# Patient Record
Sex: FEMALE | Race: WHITE | NOT HISPANIC OR LATINO
[De-identification: names, ages, dates, MRNs, and addresses within clinical notes are randomized per-mention and may not be internally consistent; named-entity substitution may affect disease eponyms.]

---

## 2017-03-21 ENCOUNTER — APPOINTMENT (OUTPATIENT)
Dept: RHEUMATOLOGY | Facility: CLINIC | Age: 77
End: 2017-03-21

## 2017-03-30 ENCOUNTER — APPOINTMENT (OUTPATIENT)
Dept: RHEUMATOLOGY | Facility: CLINIC | Age: 77
End: 2017-03-30

## 2017-03-30 VITALS
BODY MASS INDEX: 39.65 KG/M2 | WEIGHT: 210 LBS | RESPIRATION RATE: 18 BRPM | DIASTOLIC BLOOD PRESSURE: 66 MMHG | TEMPERATURE: 98.2 F | SYSTOLIC BLOOD PRESSURE: 140 MMHG | HEIGHT: 61 IN | HEART RATE: 72 BPM

## 2017-03-30 DIAGNOSIS — M54.2 CERVICALGIA: ICD-10-CM

## 2017-03-30 RX ORDER — ATORVASTATIN CALCIUM 80 MG/1
TABLET, FILM COATED ORAL
Refills: 0 | Status: ACTIVE | COMMUNITY

## 2017-03-30 RX ORDER — MULTIVITAMIN
CAPSULE ORAL
Refills: 0 | Status: ACTIVE | COMMUNITY

## 2017-03-30 RX ORDER — PSYLLIUM HUSK 0.4 G
CAPSULE ORAL
Refills: 0 | Status: ACTIVE | COMMUNITY

## 2017-03-31 LAB
BASOPHILS # BLD AUTO: 0.03 K/UL
BASOPHILS NFR BLD AUTO: 0.5 %
BILIRUB UR QL STRIP: NORMAL
COLLECTION METHOD: NORMAL
EOSINOPHIL # BLD AUTO: 0.26 K/UL
EOSINOPHIL NFR BLD AUTO: 4.8 %
FOLATE SERPL-MCNC: >20 NG/ML
GLUCOSE UR-MCNC: NORMAL
HCG UR QL: 0.2 EU/DL
HCT VFR BLD CALC: 42.1 %
HGB BLD-MCNC: 13.5 G/DL
HGB UR QL STRIP.AUTO: NORMAL
IMM GRANULOCYTES NFR BLD AUTO: 0 %
KETONES UR-MCNC: NORMAL
LEUKOCYTE ESTERASE UR QL STRIP: NORMAL
LYMPHOCYTES # BLD AUTO: 1.79 K/UL
LYMPHOCYTES NFR BLD AUTO: 32.7 %
MAN DIFF?: NORMAL
MCHC RBC-ENTMCNC: 31 PG
MCHC RBC-ENTMCNC: 32.1 GM/DL
MCV RBC AUTO: 96.8 FL
MONOCYTES # BLD AUTO: 0.38 K/UL
MONOCYTES NFR BLD AUTO: 6.9 %
NEUTROPHILS # BLD AUTO: 3.01 K/UL
NEUTROPHILS NFR BLD AUTO: 55.1 %
NITRITE UR QL STRIP: NORMAL
PH UR STRIP: 5.5
PLATELET # BLD AUTO: 213 K/UL
PROT UR STRIP-MCNC: NORMAL
RBC # BLD: 4.35 M/UL
RBC # FLD: 13.7 %
SP GR UR STRIP: 1.02
TSH SERPL-ACNC: 1.59 UIU/ML
VIT B12 SERPL-MCNC: 942 PG/ML
WBC # FLD AUTO: 5.47 K/UL
WESR: 15

## 2017-04-02 LAB
ALBUMIN SERPL ELPH-MCNC: 4 G/DL
ALP BLD-CCNC: 79 U/L
ALT SERPL-CCNC: 13 U/L
ANION GAP SERPL CALC-SCNC: 18 MMOL/L
AST SERPL-CCNC: 15 U/L
BILIRUB SERPL-MCNC: 0.3 MG/DL
BUN SERPL-MCNC: 24 MG/DL
CALCIUM SERPL-MCNC: 9.8 MG/DL
CHLORIDE SERPL-SCNC: 104 MMOL/L
CO2 SERPL-SCNC: 24 MMOL/L
CREAT SERPL-MCNC: 0.96 MG/DL
CRP SERPL-MCNC: 0.44 MG/DL
ENA SS-A AB SER IA-ACNC: <0.2 AL
ENA SS-B AB SER IA-ACNC: <0.2 AL
GLUCOSE SERPL-MCNC: 148 MG/DL
LDH SERPL-CCNC: 177 U/L
PHOSPHATE SERPL-MCNC: 3.6 MG/DL
POTASSIUM SERPL-SCNC: 3.6 MMOL/L
PROT SERPL-MCNC: 6.4 G/DL
RPR SER-TITR: NORMAL
SODIUM SERPL-SCNC: 146 MMOL/L

## 2017-04-04 LAB
DEPRECATED KAPPA LC FREE/LAMBDA SER: 1.36 RATIO
IGA SER QL IEP: 238 MG/DL
IGG SER QL IEP: 793 MG/DL
IGM SER QL IEP: 234 MG/DL
KAPPA LC CSF-MCNC: 2.05 MG/DL
KAPPA LC SERPL-MCNC: 2.78 MG/DL
M PROTEIN SPEC IFE-MCNC: NORMAL

## 2017-04-18 ENCOUNTER — APPOINTMENT (OUTPATIENT)
Dept: MAMMOGRAPHY | Facility: CLINIC | Age: 77
End: 2017-04-18

## 2017-04-18 ENCOUNTER — APPOINTMENT (OUTPATIENT)
Dept: ULTRASOUND IMAGING | Facility: CLINIC | Age: 77
End: 2017-04-18

## 2017-04-18 ENCOUNTER — OUTPATIENT (OUTPATIENT)
Dept: OUTPATIENT SERVICES | Facility: HOSPITAL | Age: 77
LOS: 1 days | End: 2017-04-18
Payer: MEDICARE

## 2017-04-18 DIAGNOSIS — Z00.00 ENCOUNTER FOR GENERAL ADULT MEDICAL EXAMINATION WITHOUT ABNORMAL FINDINGS: ICD-10-CM

## 2017-04-18 PROCEDURE — 76641 ULTRASOUND BREAST COMPLETE: CPT

## 2017-05-08 ENCOUNTER — CLINICAL ADVICE (OUTPATIENT)
Age: 77
End: 2017-05-08

## 2017-06-28 ENCOUNTER — TRANSCRIPTION ENCOUNTER (OUTPATIENT)
Age: 77
End: 2017-06-28

## 2017-06-28 ENCOUNTER — RESULT REVIEW (OUTPATIENT)
Age: 77
End: 2017-06-28

## 2017-06-28 ENCOUNTER — OUTPATIENT (OUTPATIENT)
Dept: OUTPATIENT SERVICES | Facility: HOSPITAL | Age: 77
LOS: 1 days | End: 2017-06-28
Payer: MEDICARE

## 2017-06-28 DIAGNOSIS — Z12.11 ENCOUNTER FOR SCREENING FOR MALIGNANT NEOPLASM OF COLON: ICD-10-CM

## 2017-06-28 PROCEDURE — 45385 COLONOSCOPY W/LESION REMOVAL: CPT | Mod: PT

## 2017-06-28 PROCEDURE — 88305 TISSUE EXAM BY PATHOLOGIST: CPT

## 2017-06-28 PROCEDURE — 88305 TISSUE EXAM BY PATHOLOGIST: CPT | Mod: 26

## 2017-06-28 PROCEDURE — 45380 COLONOSCOPY AND BIOPSY: CPT | Mod: PT,59

## 2017-06-29 LAB — SURGICAL PATHOLOGY FINAL REPORT - CH: SIGNIFICANT CHANGE UP

## 2017-07-11 ENCOUNTER — MEDICATION RENEWAL (OUTPATIENT)
Age: 77
End: 2017-07-11

## 2017-07-27 ENCOUNTER — APPOINTMENT (OUTPATIENT)
Dept: RHEUMATOLOGY | Facility: CLINIC | Age: 77
End: 2017-07-27
Payer: MEDICARE

## 2017-07-27 VITALS
WEIGHT: 213 LBS | RESPIRATION RATE: 17 BRPM | DIASTOLIC BLOOD PRESSURE: 69 MMHG | HEIGHT: 61 IN | SYSTOLIC BLOOD PRESSURE: 136 MMHG | TEMPERATURE: 98.1 F | HEART RATE: 62 BPM | BODY MASS INDEX: 40.22 KG/M2 | OXYGEN SATURATION: 95 %

## 2017-07-27 DIAGNOSIS — L98.9 DISORDER OF THE SKIN AND SUBCUTANEOUS TISSUE, UNSPECIFIED: ICD-10-CM

## 2017-07-27 PROCEDURE — 81003 URINALYSIS AUTO W/O SCOPE: CPT | Mod: QW

## 2017-07-27 PROCEDURE — 85651 RBC SED RATE NONAUTOMATED: CPT

## 2017-07-27 PROCEDURE — 36415 COLL VENOUS BLD VENIPUNCTURE: CPT

## 2017-07-27 PROCEDURE — 99215 OFFICE O/P EST HI 40 MIN: CPT | Mod: 25

## 2017-07-29 LAB
ALBUMIN SERPL ELPH-MCNC: 3.9 G/DL
ALP BLD-CCNC: 73 U/L
ALT SERPL-CCNC: 14 U/L
ANION GAP SERPL CALC-SCNC: 15 MMOL/L
AST SERPL-CCNC: 11 U/L
BASOPHILS # BLD AUTO: 0.02 K/UL
BASOPHILS NFR BLD AUTO: 0.3 %
BILIRUB SERPL-MCNC: 0.3 MG/DL
BILIRUB UR QL STRIP: NORMAL
BUN SERPL-MCNC: 26 MG/DL
CALCIUM SERPL-MCNC: 9.3 MG/DL
CHLORIDE SERPL-SCNC: 104 MMOL/L
CO2 SERPL-SCNC: 26 MMOL/L
COLLECTION METHOD: NORMAL
CREAT SERPL-MCNC: 0.85 MG/DL
CRP SERPL-MCNC: 0.4 MG/DL
ENA SS-A AB SER IA-ACNC: <0.2 AL
ENA SS-B AB SER IA-ACNC: <0.2 AL
EOSINOPHIL # BLD AUTO: 0.23 K/UL
EOSINOPHIL NFR BLD AUTO: 3.7 %
GLUCOSE SERPL-MCNC: 102 MG/DL
GLUCOSE UR-MCNC: NORMAL
HCG UR QL: 0.2 EU/DL
HCT VFR BLD CALC: 43.5 %
HGB BLD-MCNC: 14.3 G/DL
HGB UR QL STRIP.AUTO: NORMAL
IMM GRANULOCYTES NFR BLD AUTO: 0.2 %
KETONES UR-MCNC: NORMAL
LEUKOCYTE ESTERASE UR QL STRIP: NORMAL
LYMPHOCYTES # BLD AUTO: 1.65 K/UL
LYMPHOCYTES NFR BLD AUTO: 26.2 %
MAN DIFF?: NORMAL
MCHC RBC-ENTMCNC: 32.1 PG
MCHC RBC-ENTMCNC: 32.9 GM/DL
MCV RBC AUTO: 97.5 FL
MONOCYTES # BLD AUTO: 0.42 K/UL
MONOCYTES NFR BLD AUTO: 6.7 %
NEUTROPHILS # BLD AUTO: 3.97 K/UL
NEUTROPHILS NFR BLD AUTO: 62.9 %
NITRITE UR QL STRIP: NORMAL
PH UR STRIP: 5.5
PHOSPHATE SERPL-MCNC: 3.3 MG/DL
PLATELET # BLD AUTO: 205 K/UL
POTASSIUM SERPL-SCNC: 4.2 MMOL/L
PROT SERPL-MCNC: 6.6 G/DL
PROT UR STRIP-MCNC: NORMAL
RBC # BLD: 4.46 M/UL
RBC # FLD: 14.3 %
SODIUM SERPL-SCNC: 145 MMOL/L
SP GR UR STRIP: 1.02
WBC # FLD AUTO: 6.3 K/UL
WESR: 13

## 2017-08-27 ENCOUNTER — EMERGENCY (EMERGENCY)
Facility: HOSPITAL | Age: 77
LOS: 1 days | Discharge: HOME | End: 2017-08-27
Attending: EMERGENCY MEDICINE
Payer: MEDICARE

## 2017-08-27 VITALS
OXYGEN SATURATION: 91 % | WEIGHT: 214.95 LBS | HEIGHT: 61 IN | RESPIRATION RATE: 18 BRPM | DIASTOLIC BLOOD PRESSURE: 65 MMHG | TEMPERATURE: 99 F | HEART RATE: 57 BPM | SYSTOLIC BLOOD PRESSURE: 124 MMHG

## 2017-08-27 PROCEDURE — 73030 X-RAY EXAM OF SHOULDER: CPT

## 2017-08-27 PROCEDURE — 73060 X-RAY EXAM OF HUMERUS: CPT | Mod: 26,LT

## 2017-08-27 PROCEDURE — 73030 X-RAY EXAM OF SHOULDER: CPT | Mod: 26,LT

## 2017-08-27 PROCEDURE — 99284 EMERGENCY DEPT VISIT MOD MDM: CPT

## 2017-08-27 PROCEDURE — 90715 TDAP VACCINE 7 YRS/> IM: CPT

## 2017-08-27 PROCEDURE — 90471 IMMUNIZATION ADMIN: CPT

## 2017-08-27 PROCEDURE — 99284 EMERGENCY DEPT VISIT MOD MDM: CPT | Mod: 25

## 2017-08-27 PROCEDURE — 70486 CT MAXILLOFACIAL W/O DYE: CPT

## 2017-08-27 PROCEDURE — 72125 CT NECK SPINE W/O DYE: CPT

## 2017-08-27 PROCEDURE — 70450 CT HEAD/BRAIN W/O DYE: CPT

## 2017-08-27 PROCEDURE — 73060 X-RAY EXAM OF HUMERUS: CPT

## 2017-08-27 RX ORDER — MORPHINE SULFATE 50 MG/1
2 CAPSULE, EXTENDED RELEASE ORAL ONCE
Qty: 0 | Refills: 0 | Status: DISCONTINUED | OUTPATIENT
Start: 2017-08-27 | End: 2017-08-27

## 2017-08-27 RX ORDER — TETANUS TOXOID, REDUCED DIPHTHERIA TOXOID AND ACELLULAR PERTUSSIS VACCINE, ADSORBED 5; 2.5; 8; 8; 2.5 [IU]/.5ML; [IU]/.5ML; UG/.5ML; UG/.5ML; UG/.5ML
0.5 SUSPENSION INTRAMUSCULAR ONCE
Qty: 0 | Refills: 0 | Status: COMPLETED | OUTPATIENT
Start: 2017-08-27 | End: 2017-08-27

## 2017-08-27 RX ORDER — OMEGA-3 ACID ETHYL ESTERS 1 G
0 CAPSULE ORAL
Qty: 0 | Refills: 0 | COMMUNITY

## 2017-08-27 RX ORDER — AMLODIPINE BESYLATE 2.5 MG/1
1 TABLET ORAL
Qty: 0 | Refills: 0 | COMMUNITY

## 2017-08-27 RX ORDER — ATORVASTATIN CALCIUM 80 MG/1
0 TABLET, FILM COATED ORAL
Qty: 0 | Refills: 0 | COMMUNITY

## 2017-08-27 RX ORDER — ASPIRIN/CALCIUM CARB/MAGNESIUM 324 MG
1 TABLET ORAL
Qty: 0 | Refills: 0 | COMMUNITY

## 2017-08-27 RX ADMIN — TETANUS TOXOID, REDUCED DIPHTHERIA TOXOID AND ACELLULAR PERTUSSIS VACCINE, ADSORBED 0.5 MILLILITER(S): 5; 2.5; 8; 8; 2.5 SUSPENSION INTRAMUSCULAR at 22:49

## 2017-08-27 NOTE — ED PROVIDER NOTE - PROGRESS NOTE DETAILS
Pt refused suture. Dermabond applied to the wound. Pt's amanda, who is her most responsible for taking care of the pt. Phone number: [phone]. She explains that because the pt lives in a nursing home she will need an X-ray. Would like social work to see her for further assistance. Contacted ortho, consult pt. L humeral fracture is splinted. Called trauma for consultation. Called CT scan so pt can have studies done with no further delays at this time. after speaking with ortho pa, will need to d.u with shiva mane. [phone]. Waiting for a call back from Dr. Gibson. Pt's granddaughter, who is her most responsible for taking care of the pt. Phone number: [phone]. She explains that because the pt lives in a nursing home she will need an X-ray. Would like social work to see her for further assistance. Dr. Salcedo explains that this surgery elective procedure no need to intervene at this time. When I discussed my concern for her ability to care for herself, he explained that it would still be an out patient treatment. After speaking with ortho pa, will need to f/u with Dr. Costa Kelsey ([phone].) Waiting for a call back from Dr. Gibson. Spoke with trauma, pt is cleared. Conversed the case and updated with Granddaughter, Payton will be here first thing in the morning to speak to social work to set up more services for pt at home. Pt's granddaughter, who is her most responsible for taking care of the pt. Phone number: [phone]. She explains that because the pt lives in a nursing home she will need an X-ray. Would like social work to see her for further assistance. Patient declines pain medication. Dr. Salcedo explains that this surgery elective procedure no need to intervene at this time. When I discussed my concern for her ability to care for herself, he explained that it would still be an out patient treatment. Patient informed. Spoke with trauma, pt is cleared. Conversed the case and updated with Granddaughter, Payton will be here first thing in the morning to speak to social work to set up more services for pt at home. patient continues to  decline pain medicine, states no pain if she does not move. Patient stable. Still does not want to be admitted. Awaiting consults and family. Contacted ortho, consult pt. L humeral fracture is splinted. Called trauma for consultation. Called CT scan so pt can have studies done with no further delays at this time. Admission recommended but patient declines stating family can help her manage at home; she is adamant about not remaining. Spoke with trauma, pt is cleared. Still recommend that she she stay for placement as she is a high fall risk. Conversed the case and updated with Granddaughter, Payton will be here first thing in the morning to speak to social work to set up more services for pt at home. patient continues to  decline pain medicine, states no pain if she does not move. She is aware of her grandmother's wishes and will attempt to speak to her further. PT seen patient and DC home is safe and SW seen patient and will arrange home care

## 2017-08-27 NOTE — ED ADULT TRIAGE NOTE - CHIEF COMPLAINT QUOTE
PAtient BIBA, trip and fall from standing height, denies LOC. complains of nose and left arm pain, on baby ASA, laceration above left eye bleeding controlled at this time

## 2017-08-27 NOTE — ED ADULT NURSE NOTE - CHPI ED SYMPTOMS NEG
no numbness/no confusion/no loss of consciousness/no fever/no deformity/no vomiting/no abrasion/no tingling/no weakness

## 2017-08-27 NOTE — ED ADULT NURSE REASSESSMENT NOTE - NS ED NURSE REASSESS COMMENT FT1
patient refused labs and line and medication at this time, patient taken to BR via WC, with NA assist, patient insist on going home, after CT, MDA

## 2017-08-27 NOTE — ED ADULT NURSE NOTE - OBJECTIVE STATEMENT
reports trip and fall in back yard reports trip and fall in back yard, pt denies loc and blood thinners. pt has lac to nose and above left eye. pt reports facial pain and pain to left arm. pt reports limited rom to left arm. reports hx of b/l shoulder replacements. pt has no other complaints. a and o x3. breathing even and unlabored. lungs cta. cap refill brisk. skin w/d/i. s1 s2 rrr. + and = peripheral pulses. no le reports trip and fall in back yard, pt denies loc and blood thinners. pt has lac to nose and above left eye. pt reports facial pain and pain to left arm. pt reports limited rom to left arm. reports hx of b/l shoulder replacements. pt has no other complaints. a and o x3. breathing even and unlabored. lungs cta. cap refill brisk. skin w/d/i. s1 s2 rrr. + and = peripheral pulses. no le. abdomen soft nontender nondistended. pt has no other complaints. will continue to monitor.

## 2017-08-27 NOTE — CONSULT NOTE ADULT - SUBJECTIVE AND OBJECTIVE BOX
Pt Name: BARBI BLANCO    MRN: [id number]      Patient is a 76y Female presenting to the emergency department with a chief complaint of Fall   Patient is a 76y old  Female who presents with a chief complaint of Fall.  patient has hx of Left total shoulder replacement 2 years ago.  patient fell has left arm pain,Xrays shows midshaft humerus periprosthetic fx     HEALTH ISSUES - PROBLEM Dx: Left Periprosthetic fx       .      REVIEW OF SYSTEMS      General: no fevers 	    Skin/Breast:  	  Ophthalmologic:  	  ENMT:	    Respiratory and Thorax: no dyspnea   	  Cardiovascular: no chest pain 	    Gastrointestinal:	 no abdominal pain     Genitourinary:	    Musculoskeletal: Left midshaft humerus fx periprosthetic 	    Neurological: no loc    Psychiatric:	    Hematology/Lymphatics:	    Endocrine:	    Allergic/Immunologic:	    ROS is otherwise negative.    PAST MEDICAL & SURGICAL HISTORY:  PAST MEDICAL & SURGICAL HISTORY:  GERD (gastroesophageal reflux disease)  HLD (hyperlipidemia)  HTN (hypertension)   Surgical   Bilateral TOMASZ, TKA, Left total shoulder, appendectomy, lithotripsy   Left eye removal       Allergies: gabapentin (Unknown)  IV Contrast (Unknown)  Keflex (Unknown)      Medications: diphtheria/tetanus/pertussis (acellular) Vaccine (ADAcel) 0.5 milliLiter(s) IntraMuscular once      FAMILY HISTORY:  : non-contributory    Social History: Positive tobacco, no drug or alcohol use     Ambulation: Walking independently               PHYSICAL EXAM:    Vital Signs Last 24 Hrs  T(C): 37.1 (27 Aug 2017 18:49), Max: 37.1 (27 Aug 2017 18:49)  T(F): 98.7 (27 Aug 2017 18:49), Max: 98.7 (27 Aug 2017 18:49)  HR: 57 (27 Aug 2017 18:49) (57 - 57)  BP: 124/65 (27 Aug 2017 18:49) (124/65 - 124/65)  BP(mean): --  RR: 18 (27 Aug 2017 18:49) (18 - 18)  SpO2: 91% (27 Aug 2017 18:49) (91% - 91%)  Daily Height in cm: 154.94 (27 Aug 2017 18:49)    Daily     Appearance: Alert, responsive, in no acute distress.    Neck no tenderness    ENT Mucus Membranes moist     Neurological: Sensation is grossly intact to light touch. 5/5 motor function of all extremities. No focal deficits or weaknesses found.    Skin: no rash on visible skin. Skin is clean, dry and intact. No bleeding. No abrasions. No ulcerations.    Vascular: 2+ distal pulses. Cap refill < 2 sec. No signs of venous insuffiency or stasis. No extremity ulcerations. No cyanosis.    Musculoskeletal:         Left Upper Extremity: Sensation intact, FROM to wrist and hand, pulse intact positive swelling to upper arm,      Imaging Studies: Left humerus periprosthetic fx     Case discussed with Dr. Chowdary who gave plan     SPLINTING   PROCEDURE NOTE: Splinting    Performed by: Donny Jarvis PA-C     Indication:Left periprosthetic humerus fx     The Left arm was appropriately positioned. A orthoglass coaptation splint was applied. Distally, the extremity was neurovascular intact following the procedure. The patient tolerated the procedure well.        A/P:  Pt is a  76y Female with Patient is a 76y old  Female who presents with a chief complaint of fall found to have Left periprosthetic humerus fx     PLAN:   1. Splint,   2. Pain control   3. Suggest follow up with her Orthopedist Pt Name: BARBI BLANCO    MRN: [id number]      Patient is a 76y Female presenting to the emergency department with a chief complaint of Fall   Patient is a 76y old  Female who presents with a chief complaint of Fall.  patient has hx of Left total shoulder replacement 2 years ago.  patient fell has left arm pain,Xrays shows midshaft humerus periprosthetic fx     HEALTH ISSUES - PROBLEM Dx: Left Periprosthetic fx       .      REVIEW OF SYSTEMS      General: no fevers 	    Skin/Breast:  	  Ophthalmologic:  	  ENMT:	    Respiratory and Thorax: no dyspnea   	  Cardiovascular: no chest pain 	    Gastrointestinal:	 no abdominal pain     Genitourinary:	    Musculoskeletal: Left midshaft humerus fx periprosthetic 	    Neurological: no loc    Psychiatric:	    Hematology/Lymphatics:	    Endocrine:	    Allergic/Immunologic:	    ROS is otherwise negative.    PAST MEDICAL & SURGICAL HISTORY:  PAST MEDICAL & SURGICAL HISTORY:  GERD (gastroesophageal reflux disease)  HLD (hyperlipidemia)  HTN (hypertension)   Surgical   Bilateral TOMASZ, TKA, Left total shoulder, appendectomy, lithotripsy   Left eye removal       Allergies: gabapentin (Unknown)  IV Contrast (Unknown)  Keflex (Unknown)      Medications: diphtheria/tetanus/pertussis (acellular) Vaccine (ADAcel) 0.5 milliLiter(s) IntraMuscular once      FAMILY HISTORY:  : non-contributory    Social History: Positive tobacco, no drug or alcohol use     Ambulation: Walking independently               PHYSICAL EXAM:    Vital Signs Last 24 Hrs  T(C): 37.1 (27 Aug 2017 18:49), Max: 37.1 (27 Aug 2017 18:49)  T(F): 98.7 (27 Aug 2017 18:49), Max: 98.7 (27 Aug 2017 18:49)  HR: 57 (27 Aug 2017 18:49) (57 - 57)  BP: 124/65 (27 Aug 2017 18:49) (124/65 - 124/65)  BP(mean): --  RR: 18 (27 Aug 2017 18:49) (18 - 18)  SpO2: 91% (27 Aug 2017 18:49) (91% - 91%)  Daily Height in cm: 154.94 (27 Aug 2017 18:49)    Daily     Appearance: Alert, responsive, in no acute distress.    Neck no tenderness    ENT Mucus Membranes moist     Neurological: Sensation is grossly intact to light touch. 5/5 motor function of all extremities. No focal deficits or weaknesses found.    Skin: no rash on visible skin. Skin is clean, dry and intact. No bleeding. No abrasions. No ulcerations.    Vascular: 2+ distal pulses. Cap refill < 2 sec. No signs of venous insuffiency or stasis. No extremity ulcerations. No cyanosis.    Musculoskeletal:         Left Upper Extremity: Sensation intact, FROM to wrist and hand, pulse intact positive swelling to upper arm,      Imaging Studies: Left humerus periprosthetic fx     Case discussed with Dr. Chowdary who gave plan     SPLINTING   PROCEDURE NOTE: Splinting    Performed by: Donny Jarvis PA-C     Indication:Left periprosthetic humerus fx     The Left arm was appropriately positioned. A orthoglass coaptation splint was applied. Distally, the extremity was neurovascular intact following the procedure. The patient tolerated the procedure well.      A/P:  Pt is a  76y Female with Patient is a 76y old  Female who presents with a chief complaint of fall found to have Left periprosthetic humerus fx     PLAN:   1. Splint,   2. Pain control   3. Suggest follow up with her Orthopedist

## 2017-08-27 NOTE — ED PROVIDER NOTE - MEDICAL DECISION MAKING DETAILS
Evaluate for traumatic injury. Will obtain CT scan of the head, close wounds and obtain X-ray of R arm.

## 2017-08-27 NOTE — ED ADULT NURSE NOTE - CARDIO WDL
Last given #240 with 2 refills on 10/31/16.    Last OV 1/23/17.    Due for recheck 5/23/17.   Does not have appt scheduled.     Meds on med tab pending your approval.  CANNOT E-PRESCRIBE THIS MEDICATION.  NEEDS TO BE CALLED IN IF APPROVED.         Normal rate, regular rhythm, normal S1, S2 heart sounds heard.

## 2017-08-27 NOTE — ED PROVIDER NOTE - CHPI ED SYMPTOMS NEG
no numbness/no loss of consciousness/no tingling/no confusion/no fever/no weakness/no abrasion/no vomiting

## 2017-08-27 NOTE — ED PROVIDER NOTE - OBJECTIVE STATEMENT
75 y/o F pt with a PMHx of GERD, HLD, HTN BIBA to the ED c/o nasal bridge laceration and L shoulder pain s/p fall from standing today. Pt tripped over a step, where she hit her face on the way down. Explains that she fell because she was "just clumsy." Pt is currently taking baby aspirin. She was able to call for help by a neighbor, who called family. Tetanus is not UTD. Denies LOC, headache, chest pain, dizziness, weakness, NUMBNESS, TINGLING, chest pain, SOB or any other complaints. NKDA. 75 y/o F pt with a PMHx of GERD, HLD, HTN BIBA to the ED c/o nasal bridge laceration and L shoulder pain s/p fall from standing today. Pt tripped over a step, where she hit her face on the way down. Explains that she fell because she was "just clumsy." Pt is currently taking baby aspirin. She was able to call for help by a neighbor, who called family. Tetanus is not UTD. Denies LOC, headache, chest pain, dizziness, weakness, NUMBNESS, TINGLING, chest pain, SOB or any other complaints. Allergic to IV contrast, Gabapentin, and Keflex.

## 2017-08-27 NOTE — ED PROVIDER NOTE - CARE PLAN
Principal Discharge DX:	Humerus fracture  Secondary Diagnosis:	Nasal bone fractures  Secondary Diagnosis:	Closed head injury

## 2017-08-28 VITALS
SYSTOLIC BLOOD PRESSURE: 118 MMHG | OXYGEN SATURATION: 97 % | TEMPERATURE: 98 F | RESPIRATION RATE: 16 BRPM | DIASTOLIC BLOOD PRESSURE: 74 MMHG | HEART RATE: 58 BPM

## 2017-08-28 PROCEDURE — 73060 X-RAY EXAM OF HUMERUS: CPT | Mod: 26,LT

## 2017-08-28 PROCEDURE — 70486 CT MAXILLOFACIAL W/O DYE: CPT | Mod: 26

## 2017-08-28 PROCEDURE — 70450 CT HEAD/BRAIN W/O DYE: CPT | Mod: 26

## 2017-08-28 PROCEDURE — 72125 CT NECK SPINE W/O DYE: CPT | Mod: 26

## 2017-08-28 NOTE — DISCHARGE NOTE ADULT - PATIENT PORTAL LINK FT
“You can access the Follow[facility] Patient Portal, offered by [facility], by registering with the following website: [email or web address]”

## 2017-08-28 NOTE — PHYSICAL THERAPY INITIAL EVALUATION ADULT - DIAGNOSIS, PT EVAL
Pt is functionally at baseline level of function and not in need of skilled PT, will no longer follow

## 2017-08-28 NOTE — CHART NOTE - NSCHARTNOTEFT_GEN_A_CORE
SOCIAL WORK NOTE:  THIS WORKER SPOKE WITH CCC.  PT MET CONSULTED WITH PATIENT AND PATIENT IS SAFE TO GO HOME. FAMILY HAD REQUESTED FOR ASSIST AT HOME.  PLACED CALL TO GRANDDAUGHTER WHOM PLEASANTLY ANSWERED PHONE INDICATING SHE WAS ON HER WAY TO ER.  SHE WILL TRANSPORT PATIENT HOME AS WELL.  MADE CCC AWAR EOF [facility] REQUEST BY GRANDDAUGHTER AND SHE WILL MEET WITH HER UPON ARRIVAL.

## 2017-08-28 NOTE — PHYSICAL THERAPY INITIAL EVALUATION ADULT - ADDITIONAL COMMENTS
Pt lives in a 1 story house with granddaughter, 2 steps to enter. Amb with RW and SAC at home.  Granddaughter assists with showering, otherwise, pt is Modified Independent with all ADLs and self care.

## 2017-08-28 NOTE — PROVIDER CONTACT NOTE (OTHER) - ASSESSMENT
PT eval completed and documented.  Pt is functionally at baseline level of function, and not in need of skilled PT, will no longer follow . Pt left sitting on edge of bed in no apparent distress and call bell within reach.

## 2017-08-28 NOTE — ED ADULT NURSE REASSESSMENT NOTE - NS ED NURSE REASSESS COMMENT FT1
Pt is resting in bed comfortably at this time, no apparent distress noted at this time. pt is awaiting SW and PT consult. Pt denies any complaints at this time. Plan of care explained, will continue to monitor.

## 2017-08-28 NOTE — PHYSICAL THERAPY INITIAL EVALUATION ADULT - REHAB POTENTIAL, PT EVAL
none/Pt is functionally at baseline level of function and not in need of skilled PT, will no longer follow

## 2017-08-28 NOTE — CONSULT NOTE ADULT - ASSESSMENT
77 y/o F s/p Fall with nasal fracture and left prosthetic humeral fracture. Pt seen by ortho who will follow up with patient as outpatient  - No surgical intervention at this time  - Pain management  - Pt to follow up with orthopedics and plastic surgery   - if worsening pain, nausea, vomiting, fevers, chills to return to the ED asap.

## 2017-09-01 ENCOUNTER — RX RENEWAL (OUTPATIENT)
Age: 77
End: 2017-09-01

## 2017-09-29 NOTE — ED PROVIDER NOTE - DISPOSITION TYPE
Comments: pt presents as a non-smoker, she quit smoking on 8/22 and has not had any slips since, she is currently on the 14mg nicotine patch daily, recommend she continue the nicotine patch 14mg x another 2 weeks, will then have the patient drop to the 7mg nicotine patch, session handout provided to the patient and discussed, her urges are going down and become fewer throughout the day, will continue to monitor and follow 
DISCHARGE

## 2017-10-05 ENCOUNTER — MEDICATION RENEWAL (OUTPATIENT)
Age: 77
End: 2017-10-05

## 2017-12-04 ENCOUNTER — APPOINTMENT (OUTPATIENT)
Dept: RHEUMATOLOGY | Facility: CLINIC | Age: 77
End: 2017-12-04

## 2018-01-13 ENCOUNTER — MEDICATION RENEWAL (OUTPATIENT)
Age: 78
End: 2018-01-13

## 2018-01-17 ENCOUNTER — APPOINTMENT (OUTPATIENT)
Dept: RHEUMATOLOGY | Facility: CLINIC | Age: 78
End: 2018-01-17

## 2018-01-30 ENCOUNTER — RESULT CHARGE (OUTPATIENT)
Age: 78
End: 2018-01-30

## 2018-01-30 ENCOUNTER — LABORATORY RESULT (OUTPATIENT)
Age: 78
End: 2018-01-30

## 2018-01-30 ENCOUNTER — APPOINTMENT (OUTPATIENT)
Dept: RHEUMATOLOGY | Facility: CLINIC | Age: 78
End: 2018-01-30
Payer: MEDICARE

## 2018-01-30 VITALS
DIASTOLIC BLOOD PRESSURE: 80 MMHG | HEART RATE: 69 BPM | OXYGEN SATURATION: 99 % | RESPIRATION RATE: 18 BRPM | TEMPERATURE: 98 F | SYSTOLIC BLOOD PRESSURE: 138 MMHG | BODY MASS INDEX: 40.78 KG/M2 | HEIGHT: 61 IN | WEIGHT: 216 LBS

## 2018-01-30 PROCEDURE — 36415 COLL VENOUS BLD VENIPUNCTURE: CPT

## 2018-01-30 PROCEDURE — 81003 URINALYSIS AUTO W/O SCOPE: CPT | Mod: QW

## 2018-01-30 PROCEDURE — 99214 OFFICE O/P EST MOD 30 MIN: CPT | Mod: 25

## 2018-01-30 PROCEDURE — 85651 RBC SED RATE NONAUTOMATED: CPT

## 2018-01-31 LAB
ALBUMIN SERPL ELPH-MCNC: 4 G/DL
ALP BLD-CCNC: 104 U/L
ALT SERPL-CCNC: 14 U/L
ANION GAP SERPL CALC-SCNC: 12 MMOL/L
AST SERPL-CCNC: 15 U/L
B BURGDOR IGG+IGM SER QL IB: NORMAL
BASOPHILS # BLD AUTO: 0.03 K/UL
BASOPHILS NFR BLD AUTO: 0.4 %
BILIRUB SERPL-MCNC: 0.3 MG/DL
BILIRUB UR QL STRIP: NORMAL
BUN SERPL-MCNC: 22 MG/DL
CALCIUM SERPL-MCNC: 9.8 MG/DL
CHLORIDE SERPL-SCNC: 100 MMOL/L
CLARITY UR: CLEAR
CO2 SERPL-SCNC: 31 MMOL/L
COLLECTION METHOD: NORMAL
CREAT SERPL-MCNC: 0.79 MG/DL
CRP SERPL-MCNC: 0.4 MG/DL
DEPRECATED KAPPA LC FREE/LAMBDA SER: 0.85 RATIO
ENA SS-A AB SER IA-ACNC: <0.2 AL
ENA SS-B AB SER IA-ACNC: <0.2 AL
EOSINOPHIL # BLD AUTO: 0.35 K/UL
EOSINOPHIL NFR BLD AUTO: 5 %
FOLATE SERPL-MCNC: >20 NG/ML
GLUCOSE SERPL-MCNC: 102 MG/DL
GLUCOSE UR-MCNC: NORMAL
HCG UR QL: 0.2 EU/DL
HCT VFR BLD CALC: 42.7 %
HGB BLD-MCNC: 13.9 G/DL
HGB UR QL STRIP.AUTO: NORMAL
IGA SER QL IEP: 60 MG/DL
IGG SER QL IEP: 946 MG/DL
IGM SER QL IEP: 162 MG/DL
IMM GRANULOCYTES NFR BLD AUTO: 0.4 %
KAPPA LC CSF-MCNC: 1.2 MG/DL
KAPPA LC SERPL-MCNC: 1.02 MG/DL
KETONES UR-MCNC: NORMAL
LEUKOCYTE ESTERASE UR QL STRIP: NORMAL
LYMPHOCYTES # BLD AUTO: 2.45 K/UL
LYMPHOCYTES NFR BLD AUTO: 35.1 %
M PROTEIN SPEC IFE-MCNC: NORMAL
MAN DIFF?: NORMAL
MCHC RBC-ENTMCNC: 32.3 PG
MCHC RBC-ENTMCNC: 32.6 GM/DL
MCV RBC AUTO: 99.1 FL
MONOCYTES # BLD AUTO: 0.4 K/UL
MONOCYTES NFR BLD AUTO: 5.7 %
NEUTROPHILS # BLD AUTO: 3.73 K/UL
NEUTROPHILS NFR BLD AUTO: 53.4 %
NITRITE UR QL STRIP: NORMAL
PH UR STRIP: 5.5
PHOSPHATE SERPL-MCNC: 3.6 MG/DL
PLATELET # BLD AUTO: 227 K/UL
POTASSIUM SERPL-SCNC: 4.5 MMOL/L
PROT SERPL-MCNC: 6.8 G/DL
PROT UR STRIP-MCNC: NORMAL
RBC # BLD: 4.31 M/UL
RBC # FLD: 14.7 %
SODIUM SERPL-SCNC: 143 MMOL/L
SP GR UR STRIP: 1.02
TSH SERPL-ACNC: 2.43 UIU/ML
VIT B12 SERPL-MCNC: 932 PG/ML
WBC # FLD AUTO: 6.99 K/UL
WESR: 30

## 2018-01-31 RX ORDER — TELMISARTAN AND HYDROCHLOROTHIAZIDE 80; 25 MG/1; MG/1
80-25 TABLET ORAL
Qty: 90 | Refills: 0 | Status: DISCONTINUED | COMMUNITY
Start: 2018-01-15

## 2018-01-31 RX ORDER — POLYETHYLENE GLYCOL 3350 17 G/17G
17 POWDER, FOR SOLUTION ORAL
Qty: 527 | Refills: 0 | Status: DISCONTINUED | COMMUNITY
Start: 2017-12-15

## 2018-01-31 RX ORDER — ERYTHROMYCIN 5 MG/G
5 OINTMENT OPHTHALMIC
Qty: 4 | Refills: 0 | Status: DISCONTINUED | COMMUNITY
Start: 2017-10-19

## 2018-01-31 RX ORDER — CIPROFLOXACIN HYDROCHLORIDE 500 MG/1
500 TABLET, FILM COATED ORAL
Qty: 20 | Refills: 0 | Status: DISCONTINUED | COMMUNITY
Start: 2017-12-29

## 2018-01-31 RX ORDER — KETOCONAZOLE 20 MG/G
2 CREAM TOPICAL
Qty: 60 | Refills: 0 | Status: DISCONTINUED | COMMUNITY
Start: 2018-01-16

## 2018-01-31 RX ORDER — ATORVASTATIN CALCIUM 20 MG/1
20 TABLET, FILM COATED ORAL
Qty: 90 | Refills: 0 | Status: DISCONTINUED | COMMUNITY
Start: 2017-09-06

## 2018-02-02 LAB — RPR SER-TITR: NORMAL

## 2018-02-14 ENCOUNTER — CLINICAL ADVICE (OUTPATIENT)
Age: 78
End: 2018-02-14

## 2018-03-05 ENCOUNTER — MEDICATION RENEWAL (OUTPATIENT)
Age: 78
End: 2018-03-05

## 2018-04-12 ENCOUNTER — APPOINTMENT (OUTPATIENT)
Dept: NEUROLOGY | Facility: CLINIC | Age: 78
End: 2018-04-12
Payer: MEDICARE

## 2018-04-12 PROCEDURE — 95910 NRV CNDJ TEST 7-8 STUDIES: CPT

## 2018-04-12 PROCEDURE — 95886 MUSC TEST DONE W/N TEST COMP: CPT

## 2018-04-18 ENCOUNTER — CLINICAL ADVICE (OUTPATIENT)
Age: 78
End: 2018-04-18

## 2018-05-02 ENCOUNTER — APPOINTMENT (OUTPATIENT)
Dept: RHEUMATOLOGY | Facility: CLINIC | Age: 78
End: 2018-05-02
Payer: MEDICARE

## 2018-05-02 VITALS
BODY MASS INDEX: 41.95 KG/M2 | OXYGEN SATURATION: 95 % | RESPIRATION RATE: 18 BRPM | WEIGHT: 222 LBS | TEMPERATURE: 98 F | DIASTOLIC BLOOD PRESSURE: 70 MMHG | HEART RATE: 67 BPM | SYSTOLIC BLOOD PRESSURE: 140 MMHG

## 2018-05-02 DIAGNOSIS — M54.5 LOW BACK PAIN: ICD-10-CM

## 2018-05-02 PROCEDURE — 81003 URINALYSIS AUTO W/O SCOPE: CPT | Mod: QW

## 2018-05-02 PROCEDURE — 36415 COLL VENOUS BLD VENIPUNCTURE: CPT

## 2018-05-02 PROCEDURE — 85651 RBC SED RATE NONAUTOMATED: CPT

## 2018-05-02 PROCEDURE — 99214 OFFICE O/P EST MOD 30 MIN: CPT | Mod: 25

## 2018-05-03 LAB
BILIRUB UR QL STRIP: NORMAL
BILIRUB UR QL STRIP: NORMAL
CLARITY UR: CLEAR
CLARITY UR: CLEAR
COLLECTION METHOD: NORMAL
COLLECTION METHOD: NORMAL
GLUCOSE UR-MCNC: NORMAL
GLUCOSE UR-MCNC: NORMAL
HCG UR QL: 0.2 EU/DL
HCG UR QL: 0.2 EU/DL
HGB UR QL STRIP.AUTO: NORMAL
HGB UR QL STRIP.AUTO: NORMAL
KETONES UR-MCNC: NORMAL
KETONES UR-MCNC: NORMAL
LEUKOCYTE ESTERASE UR QL STRIP: NORMAL
LEUKOCYTE ESTERASE UR QL STRIP: NORMAL
NITRITE UR QL STRIP: NORMAL
NITRITE UR QL STRIP: NORMAL
PH UR STRIP: 6
PH UR STRIP: 6
PROT UR STRIP-MCNC: NORMAL
PROT UR STRIP-MCNC: NORMAL
SP GR UR STRIP: 1.02
SP GR UR STRIP: 1.02
WESR: 15

## 2018-05-05 LAB
ALBUMIN SERPL ELPH-MCNC: 4.1 G/DL
ALP BLD-CCNC: 88 U/L
ALT SERPL-CCNC: 19 U/L
ANION GAP SERPL CALC-SCNC: 14 MMOL/L
AST SERPL-CCNC: 14 U/L
BASOPHILS # BLD AUTO: 0.02 K/UL
BASOPHILS NFR BLD AUTO: 0.2 %
BILIRUB SERPL-MCNC: 0.3 MG/DL
BUN SERPL-MCNC: 22 MG/DL
CALCIUM SERPL-MCNC: 9.5 MG/DL
CHLORIDE SERPL-SCNC: 102 MMOL/L
CO2 SERPL-SCNC: 28 MMOL/L
CREAT SERPL-MCNC: 0.86 MG/DL
CRP SERPL-MCNC: 0.3 MG/DL
ENA SS-A AB SER IA-ACNC: <0.2 AL
ENA SS-B AB SER IA-ACNC: <0.2 AL
EOSINOPHIL # BLD AUTO: 0.27 K/UL
EOSINOPHIL NFR BLD AUTO: 3.3 %
GLUCOSE SERPL-MCNC: 102 MG/DL
HCT VFR BLD CALC: 45.2 %
HGB BLD-MCNC: 14.5 G/DL
IMM GRANULOCYTES NFR BLD AUTO: 0.6 %
LDH SERPL-CCNC: 195 U/L
LYMPHOCYTES # BLD AUTO: 1.82 K/UL
LYMPHOCYTES NFR BLD AUTO: 22.1 %
MAN DIFF?: NORMAL
MCHC RBC-ENTMCNC: 32.1 GM/DL
MCHC RBC-ENTMCNC: 32.2 PG
MCV RBC AUTO: 100.2 FL
MONOCYTES # BLD AUTO: 0.63 K/UL
MONOCYTES NFR BLD AUTO: 7.7 %
NEUTROPHILS # BLD AUTO: 5.44 K/UL
NEUTROPHILS NFR BLD AUTO: 66.1 %
PHOSPHATE SERPL-MCNC: 3.9 MG/DL
PLATELET # BLD AUTO: 199 K/UL
POTASSIUM SERPL-SCNC: 3.8 MMOL/L
PROT SERPL-MCNC: 6.9 G/DL
RBC # BLD: 4.51 M/UL
RBC # FLD: 14.8 %
SODIUM SERPL-SCNC: 144 MMOL/L
WBC # FLD AUTO: 8.23 K/UL

## 2018-05-17 ENCOUNTER — MEDICATION RENEWAL (OUTPATIENT)
Age: 78
End: 2018-05-17

## 2018-08-18 ENCOUNTER — MEDICATION RENEWAL (OUTPATIENT)
Age: 78
End: 2018-08-18

## 2018-09-27 ENCOUNTER — RX RENEWAL (OUTPATIENT)
Age: 78
End: 2018-09-27

## 2018-09-28 PROBLEM — K21.9 GASTRO-ESOPHAGEAL REFLUX DISEASE WITHOUT ESOPHAGITIS: Chronic | Status: ACTIVE | Noted: 2017-08-27

## 2018-09-28 PROBLEM — E78.5 HYPERLIPIDEMIA, UNSPECIFIED: Chronic | Status: ACTIVE | Noted: 2017-08-27

## 2018-09-28 PROBLEM — I10 ESSENTIAL (PRIMARY) HYPERTENSION: Chronic | Status: ACTIVE | Noted: 2017-08-27

## 2018-10-03 ENCOUNTER — APPOINTMENT (OUTPATIENT)
Dept: RHEUMATOLOGY | Facility: CLINIC | Age: 78
End: 2018-10-03
Payer: MEDICARE

## 2018-10-03 VITALS
SYSTOLIC BLOOD PRESSURE: 128 MMHG | WEIGHT: 220 LBS | DIASTOLIC BLOOD PRESSURE: 62 MMHG | OXYGEN SATURATION: 96 % | BODY MASS INDEX: 41.57 KG/M2 | HEART RATE: 73 BPM | RESPIRATION RATE: 18 BRPM | TEMPERATURE: 98.1 F

## 2018-10-03 DIAGNOSIS — M25.529 PAIN IN UNSPECIFIED ELBOW: ICD-10-CM

## 2018-10-03 DIAGNOSIS — G89.29 PAIN IN UNSPECIFIED ELBOW: ICD-10-CM

## 2018-10-03 PROCEDURE — 99215 OFFICE O/P EST HI 40 MIN: CPT | Mod: 25

## 2018-10-03 PROCEDURE — 36415 COLL VENOUS BLD VENIPUNCTURE: CPT

## 2018-10-03 PROCEDURE — 81003 URINALYSIS AUTO W/O SCOPE: CPT | Mod: QW

## 2018-10-05 LAB
BILIRUB UR QL STRIP: NORMAL
CLARITY UR: CLEAR
COLLECTION METHOD: NORMAL
GLUCOSE UR-MCNC: NORMAL
HCG UR QL: 0.2 EU/DL
HGB UR QL STRIP.AUTO: NORMAL
KETONES UR-MCNC: NORMAL
LEUKOCYTE ESTERASE UR QL STRIP: NORMAL
NITRITE UR QL STRIP: NORMAL
PH UR STRIP: 6
PROT UR STRIP-MCNC: NORMAL
SP GR UR STRIP: 1.02

## 2018-10-06 LAB
ALBUMIN SERPL ELPH-MCNC: 4.2 G/DL
ALP BLD-CCNC: 90 U/L
ALT SERPL-CCNC: 18 U/L
ANION GAP SERPL CALC-SCNC: 16 MMOL/L
AST SERPL-CCNC: 18 U/L
BASOPHILS # BLD AUTO: 0.03 K/UL
BASOPHILS NFR BLD AUTO: 0.5 %
BILIRUB SERPL-MCNC: 0.4 MG/DL
BUN SERPL-MCNC: 29 MG/DL
CALCIUM SERPL-MCNC: 9.6 MG/DL
CHLORIDE SERPL-SCNC: 104 MMOL/L
CO2 SERPL-SCNC: 25 MMOL/L
CREAT SERPL-MCNC: 1.02 MG/DL
CRP SERPL-MCNC: 0.29 MG/DL
ENA SS-A AB SER IA-ACNC: <0.2 AL
ENA SS-B AB SER IA-ACNC: <0.2 AL
EOSINOPHIL # BLD AUTO: 0.3 K/UL
EOSINOPHIL NFR BLD AUTO: 4.7 %
ERYTHROCYTE [SEDIMENTATION RATE] IN BLOOD BY WESTERGREN METHOD: 10 MM/HR
FOLATE SERPL-MCNC: >20 NG/ML
GLUCOSE SERPL-MCNC: 135 MG/DL
HCT VFR BLD CALC: 43.7 %
HGB BLD-MCNC: 14.8 G/DL
IMM GRANULOCYTES NFR BLD AUTO: 0.6 %
LDH SERPL-CCNC: 181 U/L
LYMPHOCYTES # BLD AUTO: 1.87 K/UL
LYMPHOCYTES NFR BLD AUTO: 29.3 %
MAN DIFF?: NORMAL
MCHC RBC-ENTMCNC: 33.3 PG
MCHC RBC-ENTMCNC: 33.9 GM/DL
MCV RBC AUTO: 98.2 FL
MONOCYTES # BLD AUTO: 0.57 K/UL
MONOCYTES NFR BLD AUTO: 8.9 %
NEUTROPHILS # BLD AUTO: 3.57 K/UL
NEUTROPHILS NFR BLD AUTO: 56 %
PHOSPHATE SERPL-MCNC: 2.8 MG/DL
PLATELET # BLD AUTO: 210 K/UL
POTASSIUM SERPL-SCNC: 3.9 MMOL/L
PROT SERPL-MCNC: 6.4 G/DL
RBC # BLD: 4.45 M/UL
RBC # FLD: 14.8 %
SODIUM SERPL-SCNC: 145 MMOL/L
VIT B12 SERPL-MCNC: 1235 PG/ML
WBC # FLD AUTO: 6.38 K/UL

## 2018-10-23 LAB
CA VI IGA AB: 7.9 EU/ML
CA VI IGG AB: 40.1 EU/ML
CA VI IGM AB: 17.8 EU/ML
PSP IGA AB: 7.9 EU/ML
PSP IGG AB: 7.2 EU/ML
PSP IGM AB: 23.5 EU/ML
SEROLOGY COMMENTS: NORMAL
SP-1 IGA AB: 15.9 EU/ML
SP-1 IGG AB: 10.4 EU/ML
SP-1 IGM AB: 19.9 EU/ML

## 2019-01-03 ENCOUNTER — LABORATORY RESULT (OUTPATIENT)
Age: 79
End: 2019-01-03

## 2019-01-03 ENCOUNTER — APPOINTMENT (OUTPATIENT)
Dept: RHEUMATOLOGY | Facility: CLINIC | Age: 79
End: 2019-01-03
Payer: MEDICARE

## 2019-01-03 VITALS
HEART RATE: 73 BPM | WEIGHT: 215 LBS | SYSTOLIC BLOOD PRESSURE: 131 MMHG | RESPIRATION RATE: 18 BRPM | TEMPERATURE: 97.5 F | DIASTOLIC BLOOD PRESSURE: 68 MMHG | BODY MASS INDEX: 40.62 KG/M2 | OXYGEN SATURATION: 97 %

## 2019-01-03 PROCEDURE — 36415 COLL VENOUS BLD VENIPUNCTURE: CPT

## 2019-01-03 PROCEDURE — 99214 OFFICE O/P EST MOD 30 MIN: CPT | Mod: 25

## 2019-01-03 RX ORDER — BACLOFEN 10 MG/1
10 TABLET ORAL
Qty: 50 | Refills: 0 | Status: DISCONTINUED | COMMUNITY
End: 2019-01-03

## 2019-01-03 RX ORDER — PREDNISONE 10 MG/1
10 TABLET ORAL
Qty: 35 | Refills: 0 | Status: DISCONTINUED | COMMUNITY
Start: 2018-05-03 | End: 2019-01-03

## 2019-01-05 LAB
ALBUMIN SERPL ELPH-MCNC: 4.2 G/DL
ALP BLD-CCNC: 84 U/L
ALT SERPL-CCNC: 17 U/L
ANION GAP SERPL CALC-SCNC: 13 MMOL/L
AST SERPL-CCNC: 15 U/L
BASOPHILS # BLD AUTO: 0.02 K/UL
BASOPHILS NFR BLD AUTO: 0.3 %
BILIRUB SERPL-MCNC: 0.3 MG/DL
BUN SERPL-MCNC: 26 MG/DL
CALCIUM SERPL-MCNC: 9.8 MG/DL
CHLORIDE SERPL-SCNC: 104 MMOL/L
CO2 SERPL-SCNC: 29 MMOL/L
CREAT SERPL-MCNC: 0.98 MG/DL
CRP SERPL-MCNC: 0.44 MG/DL
DEPRECATED KAPPA LC FREE/LAMBDA SER: 1.31 RATIO
ENA SS-A AB SER IA-ACNC: <0.2 AL
ENA SS-B AB SER IA-ACNC: <0.2 AL
EOSINOPHIL # BLD AUTO: 0.25 K/UL
EOSINOPHIL NFR BLD AUTO: 3.3 %
ERYTHROCYTE [SEDIMENTATION RATE] IN BLOOD BY WESTERGREN METHOD: 22 MM/HR
FERRITIN SERPL-MCNC: 70 NG/ML
GLUCOSE SERPL-MCNC: 90 MG/DL
HCT VFR BLD CALC: 44.1 %
HGB BLD-MCNC: 14.2 G/DL
IGA SER QL IEP: 186 MG/DL
IGG SER QL IEP: 759 MG/DL
IGM SER QL IEP: 228 MG/DL
IMM GRANULOCYTES NFR BLD AUTO: 0.5 %
IRON SATN MFR SERPL: 15 %
IRON SERPL-MCNC: 55 UG/DL
KAPPA LC CSF-MCNC: 1.88 MG/DL
KAPPA LC SERPL-MCNC: 2.46 MG/DL
LYMPHOCYTES # BLD AUTO: 2.04 K/UL
LYMPHOCYTES NFR BLD AUTO: 27.1 %
M PROTEIN SPEC IFE-MCNC: NORMAL
MAN DIFF?: NORMAL
MCHC RBC-ENTMCNC: 32 PG
MCHC RBC-ENTMCNC: 32.2 GM/DL
MCV RBC AUTO: 99.3 FL
MONOCYTES # BLD AUTO: 0.63 K/UL
MONOCYTES NFR BLD AUTO: 8.4 %
NEUTROPHILS # BLD AUTO: 4.56 K/UL
NEUTROPHILS NFR BLD AUTO: 60.4 %
PHOSPHATE SERPL-MCNC: 4 MG/DL
PLATELET # BLD AUTO: 224 K/UL
POTASSIUM SERPL-SCNC: 4.1 MMOL/L
PROT SERPL-MCNC: 6.5 G/DL
RBC # BLD: 4.44 M/UL
RBC # FLD: 14 %
SODIUM SERPL-SCNC: 146 MMOL/L
T3 SERPL-MCNC: 110 NG/DL
T3RU NFR SERPL: 1.07 INDEX
T4 SERPL-MCNC: 8.2 UG/DL
TIBC SERPL-MCNC: 369 UG/DL
TSH SERPL-ACNC: 2.08 UIU/ML
UIBC SERPL-MCNC: 314 UG/DL
WBC # FLD AUTO: 7.54 K/UL

## 2019-01-06 RX ORDER — CALCIUM 500 MG
500 TABLET ORAL
Qty: 100 | Refills: 0 | Status: ACTIVE | COMMUNITY
Start: 2019-01-06

## 2019-01-06 RX ORDER — GLUCOSAMINE HCL 500 MG
75 MCG TABLET ORAL
Refills: 0 | Status: ACTIVE | COMMUNITY

## 2019-01-06 RX ORDER — SULINDAC 150 MG/1
150 TABLET ORAL
Qty: 180 | Refills: 0 | Status: DISCONTINUED | COMMUNITY
Start: 2018-01-30 | End: 2019-01-06

## 2019-01-08 ENCOUNTER — CLINICAL ADVICE (OUTPATIENT)
Age: 79
End: 2019-01-08

## 2019-01-09 LAB
ANA PAT FLD IF-IMP: ABNORMAL
ANA SER IF-ACNC: ABNORMAL

## 2019-01-28 ENCOUNTER — CLINICAL ADVICE (OUTPATIENT)
Age: 79
End: 2019-01-28

## 2019-02-26 ENCOUNTER — MEDICATION RENEWAL (OUTPATIENT)
Age: 79
End: 2019-02-26

## 2019-04-04 ENCOUNTER — APPOINTMENT (OUTPATIENT)
Dept: RHEUMATOLOGY | Facility: CLINIC | Age: 79
End: 2019-04-04
Payer: MEDICARE

## 2019-04-04 VITALS
TEMPERATURE: 97.2 F | SYSTOLIC BLOOD PRESSURE: 120 MMHG | HEART RATE: 70 BPM | RESPIRATION RATE: 18 BRPM | DIASTOLIC BLOOD PRESSURE: 62 MMHG | OXYGEN SATURATION: 98 %

## 2019-04-04 DIAGNOSIS — M25.521 PAIN IN RIGHT ELBOW: ICD-10-CM

## 2019-04-04 PROCEDURE — 99215 OFFICE O/P EST HI 40 MIN: CPT

## 2019-04-05 RX ORDER — OXAPROZIN 600 MG/1
600 TABLET ORAL
Qty: 150 | Refills: 0 | Status: DISCONTINUED | COMMUNITY
Start: 2019-01-03 | End: 2019-04-05

## 2019-05-12 ENCOUNTER — EMERGENCY (EMERGENCY)
Facility: HOSPITAL | Age: 79
LOS: 1 days | Discharge: HOME | End: 2019-05-12
Attending: EMERGENCY MEDICINE
Payer: MEDICARE

## 2019-05-12 VITALS — OXYGEN SATURATION: 98 % | HEART RATE: 69 BPM | TEMPERATURE: 98 F | RESPIRATION RATE: 18 BRPM | WEIGHT: 214.95 LBS

## 2019-05-12 LAB
ALBUMIN SERPL ELPH-MCNC: 3.9 G/DL — SIGNIFICANT CHANGE UP (ref 3.3–5.2)
ALP SERPL-CCNC: 106 U/L — SIGNIFICANT CHANGE UP (ref 40–120)
ALT FLD-CCNC: 24 U/L — SIGNIFICANT CHANGE UP
ANION GAP SERPL CALC-SCNC: 12 MMOL/L — SIGNIFICANT CHANGE UP (ref 5–17)
APPEARANCE UR: CLEAR — SIGNIFICANT CHANGE UP
AST SERPL-CCNC: 22 U/L — SIGNIFICANT CHANGE UP
BASOPHILS # BLD AUTO: 0 K/UL — SIGNIFICANT CHANGE UP (ref 0–0.2)
BASOPHILS NFR BLD AUTO: 0.3 % — SIGNIFICANT CHANGE UP (ref 0–2)
BILIRUB SERPL-MCNC: 0.3 MG/DL — LOW (ref 0.4–2)
BILIRUB UR-MCNC: NEGATIVE — SIGNIFICANT CHANGE UP
BUN SERPL-MCNC: 29 MG/DL — HIGH (ref 8–20)
CALCIUM SERPL-MCNC: 9.3 MG/DL — SIGNIFICANT CHANGE UP (ref 8.6–10.2)
CHLORIDE SERPL-SCNC: 108 MMOL/L — HIGH (ref 98–107)
CO2 SERPL-SCNC: 27 MMOL/L — SIGNIFICANT CHANGE UP (ref 22–29)
COLOR SPEC: YELLOW — SIGNIFICANT CHANGE UP
CREAT SERPL-MCNC: 0.75 MG/DL — SIGNIFICANT CHANGE UP (ref 0.5–1.3)
DIFF PNL FLD: ABNORMAL
EOSINOPHIL # BLD AUTO: 0.2 K/UL — SIGNIFICANT CHANGE UP (ref 0–0.5)
EOSINOPHIL NFR BLD AUTO: 3.9 % — SIGNIFICANT CHANGE UP (ref 0–6)
EPI CELLS # UR: SIGNIFICANT CHANGE UP
GLUCOSE SERPL-MCNC: 124 MG/DL — HIGH (ref 70–115)
GLUCOSE UR QL: NEGATIVE MG/DL — SIGNIFICANT CHANGE UP
HCT VFR BLD CALC: 44.6 % — SIGNIFICANT CHANGE UP (ref 37–47)
HGB BLD-MCNC: 14.5 G/DL — SIGNIFICANT CHANGE UP (ref 12–16)
KETONES UR-MCNC: NEGATIVE — SIGNIFICANT CHANGE UP
LEUKOCYTE ESTERASE UR-ACNC: NEGATIVE — SIGNIFICANT CHANGE UP
LYMPHOCYTES # BLD AUTO: 1.7 K/UL — SIGNIFICANT CHANGE UP (ref 1–4.8)
LYMPHOCYTES # BLD AUTO: 28.1 % — SIGNIFICANT CHANGE UP (ref 20–55)
MCHC RBC-ENTMCNC: 32.5 G/DL — SIGNIFICANT CHANGE UP (ref 32–36)
MCHC RBC-ENTMCNC: 32.6 PG — HIGH (ref 27–31)
MCV RBC AUTO: 100.2 FL — HIGH (ref 81–99)
MONOCYTES # BLD AUTO: 0.4 K/UL — SIGNIFICANT CHANGE UP (ref 0–0.8)
MONOCYTES NFR BLD AUTO: 6.8 % — SIGNIFICANT CHANGE UP (ref 3–10)
NEUTROPHILS # BLD AUTO: 3.6 K/UL — SIGNIFICANT CHANGE UP (ref 1.8–8)
NEUTROPHILS NFR BLD AUTO: 60.4 % — SIGNIFICANT CHANGE UP (ref 37–73)
NITRITE UR-MCNC: NEGATIVE — SIGNIFICANT CHANGE UP
PH UR: 6.5 — SIGNIFICANT CHANGE UP (ref 5–8)
PLATELET # BLD AUTO: 197 K/UL — SIGNIFICANT CHANGE UP (ref 150–400)
POTASSIUM SERPL-MCNC: 4 MMOL/L — SIGNIFICANT CHANGE UP (ref 3.5–5.3)
POTASSIUM SERPL-SCNC: 4 MMOL/L — SIGNIFICANT CHANGE UP (ref 3.5–5.3)
PROT SERPL-MCNC: 6.8 G/DL — SIGNIFICANT CHANGE UP (ref 6.6–8.7)
PROT UR-MCNC: 30 MG/DL
RBC # BLD: 4.45 M/UL — SIGNIFICANT CHANGE UP (ref 4.4–5.2)
RBC # FLD: 14.2 % — SIGNIFICANT CHANGE UP (ref 11–15.6)
RBC CASTS # UR COMP ASSIST: SIGNIFICANT CHANGE UP /HPF (ref 0–4)
SODIUM SERPL-SCNC: 147 MMOL/L — HIGH (ref 135–145)
SP GR SPEC: 1.01 — SIGNIFICANT CHANGE UP (ref 1.01–1.02)
UROBILINOGEN FLD QL: NEGATIVE MG/DL — SIGNIFICANT CHANGE UP
WBC # BLD: 5.9 K/UL — SIGNIFICANT CHANGE UP (ref 4.8–10.8)
WBC # FLD AUTO: 5.9 K/UL — SIGNIFICANT CHANGE UP (ref 4.8–10.8)
WBC UR QL: NEGATIVE — SIGNIFICANT CHANGE UP

## 2019-05-12 PROCEDURE — 99284 EMERGENCY DEPT VISIT MOD MDM: CPT

## 2019-05-12 PROCEDURE — 81001 URINALYSIS AUTO W/SCOPE: CPT

## 2019-05-12 PROCEDURE — 73502 X-RAY EXAM HIP UNI 2-3 VIEWS: CPT | Mod: 26,RT

## 2019-05-12 PROCEDURE — 73562 X-RAY EXAM OF KNEE 3: CPT

## 2019-05-12 PROCEDURE — 73502 X-RAY EXAM HIP UNI 2-3 VIEWS: CPT

## 2019-05-12 PROCEDURE — 80053 COMPREHEN METABOLIC PANEL: CPT

## 2019-05-12 PROCEDURE — 72131 CT LUMBAR SPINE W/O DYE: CPT

## 2019-05-12 PROCEDURE — 72170 X-RAY EXAM OF PELVIS: CPT

## 2019-05-12 PROCEDURE — 70450 CT HEAD/BRAIN W/O DYE: CPT | Mod: 26

## 2019-05-12 PROCEDURE — 73562 X-RAY EXAM OF KNEE 3: CPT | Mod: 26,RT

## 2019-05-12 PROCEDURE — 36415 COLL VENOUS BLD VENIPUNCTURE: CPT

## 2019-05-12 PROCEDURE — 70450 CT HEAD/BRAIN W/O DYE: CPT

## 2019-05-12 PROCEDURE — 72131 CT LUMBAR SPINE W/O DYE: CPT | Mod: 26

## 2019-05-12 PROCEDURE — 85027 COMPLETE CBC AUTOMATED: CPT

## 2019-05-12 PROCEDURE — 99284 EMERGENCY DEPT VISIT MOD MDM: CPT | Mod: 25

## 2019-05-12 RX ORDER — KETOROLAC TROMETHAMINE 30 MG/ML
30 SYRINGE (ML) INJECTION ONCE
Refills: 0 | Status: DISCONTINUED | OUTPATIENT
Start: 2019-05-12 | End: 2019-05-12

## 2019-05-12 NOTE — ED ADULT TRIAGE NOTE - CHIEF COMPLAINT QUOTE
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 I use a cane in the house and a walker outside but I can't walk at all. "  Pt states she can stand but can't move or walk at all. Unable to obtain BP in triage pt ripped off BP cuff twice and was uncooperative in triage. 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 I use a cane in the house and a walker outside but I can't walk at all. "  Pt states she can stand but can't move or walk at all. Unable to obtain BP in triage pt ripped off BP cuff twice and was uncooperative in triage. Requesting manual BP

## 2019-05-12 NOTE — ED PROVIDER NOTE - EYES NEGATIVE STATEMENT, MLM
Bill Braden is a 55 year old male who presents to the office for follow up on pneumonia and possible nodule    He developed pneumonia.   F/u cxr showed one area that was initially thought to be a pneumonia as a possible nodule vs resolving pneumonia    He did see pulmonary at [facility].   They felt it was a resolving pneumonia.  They are planning on doing a CT scan to clarify.     They did have him stop his Revlimid for about 4 days. He has restarted it.     He is feeling better.  Other than his ears are plugged after coming back from a trip via a plane.     ROS:  The patient denies any fever/chills, n/v/d, lightheadness/dizziness, tinnitus, sore throat, cough, sob, wheezing, chest pain, abd pain, or rash.      Nurses notes reviewed and agreed with.    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   • Essential (primary) hypertension    • Fracture     lytic lesion L2   • GERD (gastroesophageal reflux disease)    • Gout, unspecified 8/1/2001   • Multiple myeloma (CMS/HCC) 2014   • Other and unspecified hyperlipidemia    • Pulmonary embolism (CMS/[facility]) 12/28/15   • Raynaud's syndrome    • Sinusitis, chronic    • Unspecified disorder of kidney and ureter     Renal insufficiency w/proteinuria   • Varicella without mention of complication    • Vesicoureteral reflux, unspecified or without reflux nephropathy childhood    hx of vesicular ureteral reflux, s/p both valves fixed at age 5 and 7       Past Surgical History:   Procedure Laterality Date   • BONE MARROW BIOPSY  8/28/2014   • 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   • COLONOSCOPY W BIOPSY  12-    sm polyp TC, not adenomatous, f/u 10 yrs, Dr Magallanes   • MRI LUMBAR  SPINE NO CONTRAST  2014    please see report   • ORAL SURGERY PROCEDURE      Guy Teeth Extraction   • REPAIR OF URETER  age 5 and 7    Bilateral ureteral reflux repair (one side repaired at each operative event)   • SERVICE TO UROLOGY     • ULTRASOUND,RENAL AORTA      abnormally small left kidney, this could be compatible w/reflux nephropathy; right kidney is well preserved, there may be one scar only       ALLERGIES:  No Known Allergies    Current Outpatient Prescriptions   Medication Sig Dispense Refill   • allopurinol (ZYLOPRIM) 100 MG tablet Take 2 tablets by mouth daily. 180 tablet 2   • dutasteride (AVODART) 0.5 MG capsule Take 1 capsule by mouth daily. 90 capsule 2   • acyclovir (ZOVIRAX) 400 MG tablet Take 1 tablet by mouth 2 times daily.     • calcium carbonate-vitamin D (CALCIUM 600 + D) 600-400 MG-UNIT per tablet Take 1 tablet by mouth daily. 30 tablet 0   • Multiple Vitamins-Minerals (MULTIVITAMIN MEN) Tab      • rivaroxaban (XARELTO) 10 MG Tab Take 1 tablet by mouth daily (with dinner). 12 tablet    • acetaminophen (TYLENOL) 500 MG tablet Take 1 tablet by mouth every 6 hours as needed for Pain. 30 tablet 0   • lenalidomide (REVLIMID) 10 MG Cap capsule One capsule Monday through Friday 30 capsule 0     No current facility-administered medications for this visit.        Family History   Problem Relation Age of Onset   • Cancer Father      lymphoma,     • NEGATIVE FAMILY HX OF Mother    • Heart Maternal Grandmother    • Heart Maternal Grandfather    • Diabetes Maternal Grandfather      DMII   • Genitourinary Other      no   • NEGATIVE FAMILY HX OF Sister    • NEGATIVE FAMILY HX OF Brother        Social History   Substance Use Topics   • Smoking status: Former Smoker     Packs/day: 0.75     Years: 8.00     Types: Cigarettes     Quit date: 1986   • Smokeless tobacco: Never Used   • Alcohol use 0.0 oz/week      Comment: 0-8/wk, \"COUPLE BEERS DAILY\"     PE:  GEN - NAD, A&OX3, generally  healthy appearance  HEENT - NCAT; PERRLA; EOMI; Fundus - no papilledema; TM - clear B but with some pressure;    MMM; Mouth - clear; Nose - clear; No cervical or supraclavicular       lymphadenopathy; No thyromegaly; No sinus tenderness.   HEART - RRR without M/R/C/G, PMI nondisplaced.  LUNGS - CTA B, good AE, no wz, no retractions.  ALL EXT - FROM, muscle strength 5/5 all ext and symmetric;       2/4 distal pulses all ext, no edema, good cap refill,       intact sensation, negative homans.  SKIN - no rashes/lesions      A -  1) Pneumonia vs lung nodule   2) Eustachian tube dysfunction    P - 1) Pneumonia vs lung nodule - new - Reviewed the results with the patient. discussed treatment options. Discussed potential side effects of the medications/procedures with the patient. Will await results of the CT scan     2) Eustachian tube dysfunction - new - discussed treatment options.  Discussed potential side effects of the medications/procedures with the patient.  He will give trial of otc Flonase    Greater than 50% of the visit was spent counseling regarding above issues; total time spent 20 minutes.      Patient to follow up for routine health care and prn.    no discharge, no irritation, no pain, no redness, and no visual changes.

## 2019-05-12 NOTE — ED PROVIDER NOTE - OBJECTIVE STATEMENT
78 year old female with PMH HTN, HLD, knee replacement, who presents with difficulty walking. Pt states that she has had several days of R hip pain, and then woke from sleep yesterday with inability to ambulate on her R leg. He states that her right leg is "weak and gives out" when she tries to ambulate. At baseline she is ambulatory with a walker. She denies numbness, tingling, fever, chills, slurred speech, facial droop, bowel/bladder dysfunction.

## 2019-05-12 NOTE — ED PROVIDER NOTE - PROGRESS NOTE DETAILS
CT reviewed, negative. XRAYS reviewed negative. Pt re-examined mild atraumatic rt hip pain but here for inability to 'put weight on leg' it 'gives out' patient states difficult to explain. on exam 5/5 rt hip flexion, dorsi/plantarflexion. sensation intact, +ttp rt proximal thigh/greater troch, no pain with ROM hip. Pt states attemtped to go to bathroom, still unable to ambulate at baseline. will need PT eval in AM. then SW for [facility]ANCE if needed -Glenda DO Case s/o to me by Dr Doshi to follow up with PT and SW evaluation and follow their recommendation.  SW/PT recommend dc home and have home PT

## 2019-05-12 NOTE — ED ADULT NURSE REASSESSMENT NOTE - NS ED NURSE REASSESS COMMENT FT1
Handoff received from offgoing RN. Charting as noted. Pt. received AxOx4, resting in stretcher, in no apparent distress. Pt. denies pain at this time. Family at the bedside. Discussed plan of care with pt and family, pt to be seen by PT and SW in AM, pt and family verbalized understanding. Vital signs stable and as charted. Commode at the bedside as per pt request. Pt. safety and comfort measures maintained. Handoff received from offgoing RN. Charting as noted. Pt. received AxOx4, resting in stretcher, in no apparent distress. Pt. denies pain at this time. Family at the bedside. Discussed plan of care with pt and family, pt to be seen by PT and SW in AM, pt and family verbalized understanding. Vital signs stable and as charted. Commode at the bedside as per pt request. Fall Risk precautions maintained. Pt. safety and comfort measures maintained.

## 2019-05-12 NOTE — ED PROVIDER NOTE - CLINICAL SUMMARY MEDICAL DECISION MAKING FREE TEXT BOX
Pt with no focal deficits on exam, but inability to ambulate, which appears to be acute on chronic issue. Will need PT and SW

## 2019-05-12 NOTE — ED STATDOCS - PROGRESS NOTE DETAILS
77 y/o F pt with hx of shoulder replacement x2, HTN, HLD, and GERD presents to the ED with grand-daughter c/o inability to walk starting this AM with weakness. Per granddaughter states pt has artificial joints in her knees b/l and R hip. Reports last night she was able to get up and got to the bathroom, however when she woke up this morning pt was unable to ambulate completely. Pt is unable to bear weight on her knees. Pt normally ambulates at home with walker and cane. Pt has been self-medicating with muscle relaxers; last dose was last night with no relief. Denies recent trauma and falls. No further complaints at this time.  **Anticipate prolonged stay for potential PT, and social work evaluation, will therefore send to [facility] for further evaluation.

## 2019-05-13 VITALS
SYSTOLIC BLOOD PRESSURE: 145 MMHG | DIASTOLIC BLOOD PRESSURE: 64 MMHG | HEART RATE: 59 BPM | TEMPERATURE: 99 F | OXYGEN SATURATION: 89 % | RESPIRATION RATE: 17 BRPM

## 2019-05-13 NOTE — ED ADULT NURSE NOTE - CHIEF COMPLAINT QUOTE
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 I use a cane in the house and a walker outside but I can't walk at all. "  Pt states she can stand but can't move or walk at all. Unable to obtain BP in triage pt ripped off BP cuff twice and was uncooperative in triage. Requesting manual BP

## 2019-05-13 NOTE — ED ADULT NURSE REASSESSMENT NOTE - NS ED NURSE REASSESS COMMENT FT1
Assumed pt. care from night RN> Pt. sitting in chair at bedside with NC in place. Reassess requirement for oxygen and patient was 89% on RA, increased NC to 2L; patient is at 94% and breathing comfortably. Pt. HR is 48 and denies any CP or SOB at this time. Upon assessment, patient has full ROM of right hip and knee and denies any pain with movement, denies any numbness or tingling. Pt. has tenderness of right lateral hip and right SI joint although pain is not elicited with movement. Will continue to monitor patient.

## 2019-05-13 NOTE — PHYSICAL THERAPY INITIAL EVALUATION ADULT - ADDITIONAL COMMENTS
Pt lives in a mother daughter house, her apartment is downstairs. There are 3 STEPHANIE with a trellis that she hold onto and no steps inside. Someone is always with her on the stairs at baseline. Granddaughter taker her to dr appointments. They have a w/c, commode in her bedroom, canes and RW. Granddaughter works during the day.

## 2019-05-13 NOTE — PROVIDER CONTACT NOTE (OTHER) - ACTION/TREATMENT ORDERED:
Goals; within 1 week  Gait training: independent with cane 150 feet  Stairs: Independent with cane a 1 rail, 3 steps.

## 2019-05-13 NOTE — DISCHARGE NOTE NURSING/CASE MANAGEMENT/SOCIAL WORK - NSDCDPATPORTLINK_GEN_ALL_CORE
You can access the "[employer] Patient Portal, offered by [facility], by registering with the following website: [email or web address]

## 2019-05-13 NOTE — PHYSICAL THERAPY INITIAL EVALUATION ADULT - ACTIVE RANGE OF MOTION EXAMINATION, REHAB EVAL
L shoulder flexion to 45deg/bilateral  lower extremity Active ROM was WFL (within functional limits)/Right UE Active ROM was WFL (within functional limits)/deficits as listed below

## 2019-05-13 NOTE — PROVIDER CONTACT NOTE (OTHER) - ASSESSMENT
Discharge planning discussed with patient today. Patient lives in her own home. Patient's Payton patel lives in a seperate apartment in patient's home. [place] assists patient with transportation to MD appointments and ADL's as needed. Patient owns a walker, bedside Commode and a Shower Chair. Patient states that she has [employer] bus transportation and receives services via the [employer]. Patient also receives Meals on Wheels. Patient states that Mary at the [employer] is currently working on finding patient Aide assist in the afternoon ( as per patient request)  .Physical therapy evaluation completed. Physical therapy recommending PT at home. List of [facility] agencies provided. Patient requesting [facility]. Referral placed. PMD Mao Gustafson. Pharmacy: [facility] [place], [place] .
Patient's case accepted by [facility] for requested SOC 5/14/19.
Pt now c/o R distal thigh/knee pain 5/10 while ambulating and on palpation, 0/10 at rest pre and post PT, reports 0/10 R hip pain throughout PT evaluation.

## 2019-05-13 NOTE — PHYSICAL THERAPY INITIAL EVALUATION ADULT - PERTINENT HX OF CURRENT PROBLEM, REHAB EVAL
Pt is an 77 y/o female who presented from home with weakness and inability to ambulate 2/2 R hip pain.

## 2019-05-13 NOTE — ED ADULT NURSE REASSESSMENT NOTE - NS ED NURSE REASSESS COMMENT FT1
Pt. resting in stretcher, watching TV, in no apparent distress. Vital signs stable and as charted. Pt. requesting PO fluids, water provided. Pt. offers no other complaints at this time. Side rails up x 2 for safety. Fall Risk precautions maintained. Commode remains at bedside as per pt. request. Pt. safety and comfort measures maintained.

## 2019-05-21 ENCOUNTER — MEDICATION RENEWAL (OUTPATIENT)
Age: 79
End: 2019-05-21

## 2019-09-02 ENCOUNTER — MEDICATION RENEWAL (OUTPATIENT)
Age: 79
End: 2019-09-02

## 2019-10-04 ENCOUNTER — APPOINTMENT (OUTPATIENT)
Dept: RHEUMATOLOGY | Facility: CLINIC | Age: 79
End: 2019-10-04

## 2019-10-14 ENCOUNTER — MEDICATION RENEWAL (OUTPATIENT)
Age: 79
End: 2019-10-14

## 2019-10-22 ENCOUNTER — MEDICATION RENEWAL (OUTPATIENT)
Age: 79
End: 2019-10-22

## 2019-11-24 ENCOUNTER — MEDICATION RENEWAL (OUTPATIENT)
Age: 79
End: 2019-11-24

## 2020-01-03 ENCOUNTER — LABORATORY RESULT (OUTPATIENT)
Age: 80
End: 2020-01-03

## 2020-01-03 ENCOUNTER — APPOINTMENT (OUTPATIENT)
Dept: RHEUMATOLOGY | Facility: CLINIC | Age: 80
End: 2020-01-03
Payer: MEDICARE

## 2020-01-03 VITALS
HEART RATE: 70 BPM | BODY MASS INDEX: 41.23 KG/M2 | HEIGHT: 60 IN | TEMPERATURE: 98.3 F | WEIGHT: 210 LBS | OXYGEN SATURATION: 98 % | SYSTOLIC BLOOD PRESSURE: 142 MMHG | RESPIRATION RATE: 17 BRPM | DIASTOLIC BLOOD PRESSURE: 70 MMHG

## 2020-01-03 DIAGNOSIS — H04.123 DRY EYE SYNDROME OF BILATERAL LACRIMAL GLANDS: ICD-10-CM

## 2020-01-03 DIAGNOSIS — L65.9 NONSCARRING HAIR LOSS, UNSPECIFIED: ICD-10-CM

## 2020-01-03 PROCEDURE — 81003 URINALYSIS AUTO W/O SCOPE: CPT | Mod: QW

## 2020-01-03 PROCEDURE — 36415 COLL VENOUS BLD VENIPUNCTURE: CPT

## 2020-01-03 PROCEDURE — 99215 OFFICE O/P EST HI 40 MIN: CPT | Mod: 25

## 2020-01-05 RX ORDER — PIROXICAM 10 MG/1
10 CAPSULE ORAL
Qty: 90 | Refills: 0 | Status: DISCONTINUED | COMMUNITY
Start: 2019-04-05 | End: 2020-01-05

## 2020-01-05 RX ORDER — DICLOFENAC SODIUM 10 MG/G
1 GEL TOPICAL
Qty: 4 | Refills: 0 | Status: DISCONTINUED | COMMUNITY
Start: 2019-04-05 | End: 2020-01-05

## 2020-01-05 RX ORDER — DICLOFENAC SODIUM 50 MG/1
50 TABLET, DELAYED RELEASE ORAL
Qty: 60 | Refills: 2 | Status: DISCONTINUED | COMMUNITY
Start: 2019-04-17 | End: 2020-01-05

## 2020-01-06 ENCOUNTER — RESULT CHARGE (OUTPATIENT)
Age: 80
End: 2020-01-06

## 2020-01-06 LAB
ALBUMIN SERPL ELPH-MCNC: 4 G/DL
ALP BLD-CCNC: 95 U/L
ALT SERPL-CCNC: 13 U/L
ANION GAP SERPL CALC-SCNC: 15 MMOL/L
AST SERPL-CCNC: 15 U/L
B2 GLYCOPROT1 AB SER QL: POSITIVE
BASOPHILS # BLD AUTO: 0.04 K/UL
BASOPHILS NFR BLD AUTO: 0.6 %
BILIRUB SERPL-MCNC: 0.3 MG/DL
BILIRUB UR QL STRIP: NORMAL
BUN SERPL-MCNC: 25 MG/DL
CALCIUM SERPL-MCNC: 9.5 MG/DL
CARDIOLIPIN AB SER IA-ACNC: POSITIVE
CHLORIDE SERPL-SCNC: 104 MMOL/L
CLARITY UR: CLEAR
CO2 SERPL-SCNC: 26 MMOL/L
COLLECTION METHOD: NORMAL
CREAT SERPL-MCNC: 0.87 MG/DL
CRP SERPL-MCNC: 0.55 MG/DL
ENA SS-A AB SER IA-ACNC: <0.2 AL
ENA SS-B AB SER IA-ACNC: <0.2 AL
EOSINOPHIL # BLD AUTO: 0.22 K/UL
EOSINOPHIL NFR BLD AUTO: 3.4 %
ERYTHROCYTE [SEDIMENTATION RATE] IN BLOOD BY WESTERGREN METHOD: 38 MM/HR
FOLATE SERPL-MCNC: >20 NG/ML
GLUCOSE SERPL-MCNC: 95 MG/DL
GLUCOSE UR-MCNC: NORMAL
HAPTOGLOB SERPL-MCNC: 231 MG/DL
HCG UR QL: 0.2 EU/DL
HCT VFR BLD CALC: 43.9 %
HGB BLD-MCNC: 14 G/DL
HGB UR QL STRIP.AUTO: NORMAL
IMM GRANULOCYTES NFR BLD AUTO: 0.5 %
KETONES UR-MCNC: NORMAL
LEUKOCYTE ESTERASE UR QL STRIP: NORMAL
LYMPHOCYTES # BLD AUTO: 1.85 K/UL
LYMPHOCYTES NFR BLD AUTO: 28.2 %
MAN DIFF?: NORMAL
MCHC RBC-ENTMCNC: 31.9 GM/DL
MCHC RBC-ENTMCNC: 32.6 PG
MCV RBC AUTO: 102.1 FL
MONOCYTES # BLD AUTO: 0.48 K/UL
MONOCYTES NFR BLD AUTO: 7.3 %
NEUTROPHILS # BLD AUTO: 3.94 K/UL
NEUTROPHILS NFR BLD AUTO: 60 %
NITRITE UR QL STRIP: NORMAL
PH UR STRIP: 5
PHOSPHATE SERPL-MCNC: 4.2 MG/DL
PLATELET # BLD AUTO: 227 K/UL
POTASSIUM SERPL-SCNC: 3.9 MMOL/L
PROT SERPL-MCNC: 6.5 G/DL
PROT UR STRIP-MCNC: NORMAL
RBC # BLD: 4.3 M/UL
RBC # FLD: 13.5 %
SODIUM SERPL-SCNC: 145 MMOL/L
SP GR UR STRIP: 1.02
TSH SERPL-ACNC: 2.08 UIU/ML
VIT B12 SERPL-MCNC: 882 PG/ML
WBC # FLD AUTO: 6.56 K/UL

## 2020-03-20 ENCOUNTER — OUTPATIENT (OUTPATIENT)
Dept: OUTPATIENT SERVICES | Facility: HOSPITAL | Age: 80
LOS: 1 days | End: 2020-03-20

## 2020-03-20 ENCOUNTER — APPOINTMENT (OUTPATIENT)
Dept: MRI IMAGING | Facility: CLINIC | Age: 80
End: 2020-03-20
Payer: MEDICARE

## 2020-03-20 ENCOUNTER — OUTPATIENT (OUTPATIENT)
Dept: OUTPATIENT SERVICES | Facility: HOSPITAL | Age: 80
LOS: 1 days | End: 2020-03-20
Payer: MEDICARE

## 2020-03-20 DIAGNOSIS — Z00.8 ENCOUNTER FOR OTHER GENERAL EXAMINATION: ICD-10-CM

## 2020-03-20 DIAGNOSIS — Z00.00 ENCOUNTER FOR GENERAL ADULT MEDICAL EXAMINATION WITHOUT ABNORMAL FINDINGS: ICD-10-CM

## 2020-03-20 PROCEDURE — 74018 RADEX ABDOMEN 1 VIEW: CPT | Mod: 26

## 2020-03-20 PROCEDURE — 74018 RADEX ABDOMEN 1 VIEW: CPT

## 2020-03-20 PROCEDURE — 72158 MRI LUMBAR SPINE W/O & W/DYE: CPT | Mod: 26

## 2020-04-28 NOTE — ED ADULT NURSE NOTE - NS ED NURSE LEVEL OF CONSCIOUSNESS MENTAL STATUS
Awake/Alert Glycopyrrolate Pregnancy And Lactation Text: This medication is Pregnancy Category B and is considered safe during pregnancy. It is unknown if it is excreted breast milk.

## 2020-05-05 ENCOUNTER — APPOINTMENT (OUTPATIENT)
Dept: RHEUMATOLOGY | Facility: CLINIC | Age: 80
End: 2020-05-05

## 2020-05-22 ENCOUNTER — APPOINTMENT (OUTPATIENT)
Dept: RHEUMATOLOGY | Facility: CLINIC | Age: 80
End: 2020-05-22

## 2020-06-17 ENCOUNTER — APPOINTMENT (OUTPATIENT)
Dept: RHEUMATOLOGY | Facility: CLINIC | Age: 80
End: 2020-06-17
Payer: MEDICARE

## 2020-06-17 VITALS
WEIGHT: 212 LBS | BODY MASS INDEX: 40.02 KG/M2 | HEART RATE: 65 BPM | RESPIRATION RATE: 17 BRPM | HEIGHT: 61 IN | SYSTOLIC BLOOD PRESSURE: 132 MMHG | OXYGEN SATURATION: 97 % | DIASTOLIC BLOOD PRESSURE: 86 MMHG

## 2020-06-17 VITALS — TEMPERATURE: 98.9 F

## 2020-06-17 PROCEDURE — 99215 OFFICE O/P EST HI 40 MIN: CPT | Mod: 25

## 2020-06-17 PROCEDURE — 81003 URINALYSIS AUTO W/O SCOPE: CPT | Mod: QW

## 2020-06-21 LAB
BILIRUB UR QL STRIP: NORMAL
CLARITY UR: CLEAR
COLLECTION METHOD: NORMAL
GLUCOSE UR-MCNC: NORMAL
HCG UR QL: 0.2 EU/DL
HGB UR QL STRIP.AUTO: NORMAL
KETONES UR-MCNC: NORMAL
LEUKOCYTE ESTERASE UR QL STRIP: NORMAL
NITRITE UR QL STRIP: NORMAL
PH UR STRIP: 5
PROT UR STRIP-MCNC: NORMAL
SP GR UR STRIP: 1.02

## 2020-06-21 RX ORDER — ASPIRIN 81 MG
81 TABLET, DELAYED RELEASE (ENTERIC COATED) ORAL
Refills: 0 | Status: ACTIVE | COMMUNITY

## 2020-07-10 ENCOUNTER — APPOINTMENT (OUTPATIENT)
Dept: ORTHOPEDIC SURGERY | Facility: CLINIC | Age: 80
End: 2020-07-10
Payer: MEDICARE

## 2020-07-10 VITALS — WEIGHT: 212 LBS | BODY MASS INDEX: 40.02 KG/M2 | HEIGHT: 61 IN

## 2020-07-10 VITALS — TEMPERATURE: 98.9 F

## 2020-07-10 DIAGNOSIS — Z82.61 FAMILY HISTORY OF ARTHRITIS: ICD-10-CM

## 2020-07-10 DIAGNOSIS — C69.20: ICD-10-CM

## 2020-07-10 PROCEDURE — 99205 OFFICE O/P NEW HI 60 MIN: CPT

## 2020-07-10 NOTE — PHYSICAL EXAM
[de-identified] : MRI from [facility] dated 3 of 2020 shows moderate to severe spinal stenosis at L1 and L2. [de-identified] : CONSTITUTIONAL: The patient is a very pleasant individual who is well-nourished and who appears stated age.\par PSYCHIATRIC: The patient is alert and oriented X 3 and in no apparent distress, and participates with orthopedic evaluation well.\par HEAD: Atraumatic and is nonsyndromic in appearance.\par EENT: No visible thyromegaly, EOMI.\par RESPIRATORY: Respiratory rate is regular, not dyspneic on examination.\par LYMPHATICS: There is no inguinal lymphadenopathy\par INTEGUMENTARY: Skin is clean, dry, and intact about the bilateral lower extremities and lumbar spine.\par VASCULAR: There is brisk capillary refill about the bilateral lower extremities.\par NEUROLOGIC: There are no pathologic reflexes. There is no decrease in sensation of the bilateral lower extremities on Wartenberg pinwheel/manual examination. Deep tendon reflexes are well maintained at 2+/4 of the bilateral lower extremities and are symmetric..\par MUSCULOSKELETAL: There is no visible muscular atrophy. Manual motor strength is well maintained in the bilateral lower extremities. Range of motion of lumbar spine is well maintained. The patient ambulates in a non-myelopathic manner. Negative tension sign and straight leg raise bilaterally. Quad extension, ankle dorsiflexion, EHL, plantar flexion, and ankle eversion are well preserved. Normal secondary orthopaedic exam of bilateral hips, greater trochanteric area, knees and ankles There is a water rolling walker present in the room. Physical exam on today's date is consistent primarily with mechanically orientated low back pain\par

## 2020-07-10 NOTE — HISTORY OF PRESENT ILLNESS
[Stable] : stable [de-identified] : 79-year-old female presents with a complaint of chronic low back pain. Patient states she's been utilizing a rolling walker for many years. She is also status post spine surgery many years ago. She also states that hip replacement many years ago. Patient states there is no progressive weakness and her pain going down her legs. patient's JOSUE questionnaire is negative

## 2020-07-10 NOTE — DISCUSSION/SUMMARY
[de-identified] : Patient with multiple medical comorbidity increasing the risk of perioperative and postoperative complications as well as diminished spine outcomes as per the current medical literature. These include but are not limited to obesity, anxiety/depression, cardiac illness, kidney disease, peripheral vascular disease, history of cancer, COPD, dysmetabolic syndrome including but not limited to diabetes, hyperlipidemia, hypertension. Patient is being referred back to primary care provider for medical optimization, as well as other appropriate specialists as needed for optimization prior to spine surgery. \par \par Despite severe stenosis at L1-L2 patient is presenting without signs and symptoms of neurogenic claudication this may be from her underlying activity level. I believe she is a low demand patient. Patient clearly states she is not interested in surgical discussions were then recommend injection therapy and her pain management and physical modalities. Patient can follow up on an as needed basis with regard to spinal surgical discussions if she wishes for surgical conversations would be more than happy to discuss her options with her

## 2020-08-28 ENCOUNTER — LABORATORY RESULT (OUTPATIENT)
Age: 80
End: 2020-08-28

## 2020-08-28 ENCOUNTER — APPOINTMENT (OUTPATIENT)
Dept: PHYSICAL MEDICINE AND REHAB | Facility: CLINIC | Age: 80
End: 2020-08-28
Payer: MEDICARE

## 2020-08-28 VITALS
HEIGHT: 61 IN | SYSTOLIC BLOOD PRESSURE: 134 MMHG | TEMPERATURE: 97.4 F | WEIGHT: 212 LBS | DIASTOLIC BLOOD PRESSURE: 73 MMHG | BODY MASS INDEX: 40.02 KG/M2

## 2020-08-28 DIAGNOSIS — G89.29 LOW BACK PAIN: ICD-10-CM

## 2020-08-28 DIAGNOSIS — M54.5 LOW BACK PAIN: ICD-10-CM

## 2020-08-28 PROCEDURE — 99202 OFFICE O/P NEW SF 15 MIN: CPT

## 2020-08-28 NOTE — HISTORY OF PRESENT ILLNESS
[FreeTextEntry1] : Ms. BARBI BLANCO is a 79 year old female h/o decompression at L2/3 L3/4??? > 10 years ago, b/l shoulder and knee replacement and R THR. Present for initial evaluation for chronic back pain  - denies antecedent event.  Worse with prolong sitting, standing, walking, Valsalva maneuver.  Better with laying down. At it's worse, she reports pain radiating down right lower extremity.  She take tylenol PRN.

## 2020-08-28 NOTE — PHYSICAL EXAM
[FreeTextEntry1] : NAD\par A&Ox3\par Obese\par Gait - forward flexed, antalgic\par Inspection: normal muscle bulk without asymmetry\par Slightly forward flexed at waist\par Tenderness to palpation: significant over right SIJ > lumbar paraspinal\par ROM knees: bilateral knee contracture\par ROM Hips: smooth IR/ER without pain or pelvic tilt\par MMT: 5/5 bilateral lower extremities (KE, KF, DF, PF, EHL) - HF right (4+/5) left (4/5)\par Reflexes: symmetric bilateral patella (abs), ankle jerk right (1+), left (abs)\par Sensory: SILT in all dermatomes of the bilateral lower extremities

## 2020-08-28 NOTE — ASSESSMENT
[FreeTextEntry1] : 79 y.o. F w/ CLBP likely 2' advanced lumbar DDD/spondylosis + RIGHT SACROILIITIS.  Pt. has h/o severe L1-2 central canal stenosis documented on MRI but w/o sxs of cauda equina compression.  Will try to obtain her prior operative note from Dr. Mills's office.  Pt. deferred Rx for P.T. 2' transportation issues.  Rx Lidoderm patch to be applied as directed.  Discussed R/B/A to RIGHT SI joint CSI under fluoroscopy which she has agreed to.  Pt. will be tentatively scheduled w/i next 2 weeks.

## 2020-08-28 NOTE — DATA REVIEWED
[MRI] : MRI [FreeTextEntry1] : MRI L-spine: Multilevel degenerative changes of the lumbar spine most pronounced at L1-L2, resulting in severe canal stenosis and severe neuroforaminal narrowing at this level, with associated mass effect and compression of the \par cauda equina nerve roots. Correlation with symptoms and clinical exam findings is advised. Mild chronic compression fracture of T12 vertebral body, unchanged from prior imaging given for differences in technique.

## 2020-09-05 LAB
ALBUMIN SERPL ELPH-MCNC: 4.2 G/DL
ALP BLD-CCNC: 76 U/L
ALT SERPL-CCNC: 14 U/L
ANION GAP SERPL CALC-SCNC: 14 MMOL/L
AST SERPL-CCNC: 17 U/L
B2 GLYCOPROT1 AB SER QL: POSITIVE
BASOPHILS # BLD AUTO: 0.04 K/UL
BASOPHILS NFR BLD AUTO: 0.6 %
BILIRUB SERPL-MCNC: 0.5 MG/DL
BUN SERPL-MCNC: 32 MG/DL
CALCIUM SERPL-MCNC: 9.9 MG/DL
CARDIOLIPIN AB SER IA-ACNC: POSITIVE
CHLORIDE SERPL-SCNC: 104 MMOL/L
CO2 SERPL-SCNC: 26 MMOL/L
CREAT SERPL-MCNC: 1.26 MG/DL
CRP SERPL-MCNC: 0.28 MG/DL
ENA SS-A AB SER IA-ACNC: <0.2 AL
ENA SS-B AB SER IA-ACNC: <0.2 AL
EOSINOPHIL # BLD AUTO: 0.24 K/UL
EOSINOPHIL NFR BLD AUTO: 3.6 %
ERYTHROCYTE [SEDIMENTATION RATE] IN BLOOD BY WESTERGREN METHOD: 21 MM/HR
FOLATE SERPL-MCNC: >20 NG/ML
GLUCOSE SERPL-MCNC: 101 MG/DL
HCT VFR BLD CALC: 43.5 %
HGB BLD-MCNC: 14.4 G/DL
IMM GRANULOCYTES NFR BLD AUTO: 0.5 %
LYMPHOCYTES # BLD AUTO: 2.02 K/UL
LYMPHOCYTES NFR BLD AUTO: 30.7 %
MAN DIFF?: NORMAL
MCHC RBC-ENTMCNC: 32.7 PG
MCHC RBC-ENTMCNC: 33.1 GM/DL
MCV RBC AUTO: 98.9 FL
MONOCYTES # BLD AUTO: 0.5 K/UL
MONOCYTES NFR BLD AUTO: 7.6 %
NEUTROPHILS # BLD AUTO: 3.75 K/UL
NEUTROPHILS NFR BLD AUTO: 57 %
PHOSPHATE SERPL-MCNC: 3.4 MG/DL
PLATELET # BLD AUTO: 202 K/UL
POTASSIUM SERPL-SCNC: 4.3 MMOL/L
PROT SERPL-MCNC: 6.4 G/DL
RBC # BLD: 4.4 M/UL
RBC # FLD: 13.8 %
SODIUM SERPL-SCNC: 145 MMOL/L
VIT B12 SERPL-MCNC: 1411 PG/ML
WBC # FLD AUTO: 6.58 K/UL

## 2020-10-21 DIAGNOSIS — Z01.818 ENCOUNTER FOR OTHER PREPROCEDURAL EXAMINATION: ICD-10-CM

## 2020-10-23 ENCOUNTER — APPOINTMENT (OUTPATIENT)
Dept: DISASTER EMERGENCY | Facility: CLINIC | Age: 80
End: 2020-10-23

## 2020-10-24 LAB — SARS-COV-2 N GENE NPH QL NAA+PROBE: NOT DETECTED

## 2020-10-25 ENCOUNTER — TRANSCRIPTION ENCOUNTER (OUTPATIENT)
Age: 80
End: 2020-10-25

## 2020-10-26 ENCOUNTER — APPOINTMENT (OUTPATIENT)
Dept: PHYSICAL MEDICINE AND REHAB | Facility: GI CENTER | Age: 80
End: 2020-10-26
Payer: MEDICARE

## 2020-10-26 ENCOUNTER — OUTPATIENT (OUTPATIENT)
Dept: OUTPATIENT SERVICES | Facility: HOSPITAL | Age: 80
LOS: 1 days | End: 2020-10-26
Payer: MEDICARE

## 2020-10-26 DIAGNOSIS — M46.1 SACROILIITIS, NOT ELSEWHERE CLASSIFIED: ICD-10-CM

## 2020-10-26 PROCEDURE — G0260: CPT

## 2020-10-26 PROCEDURE — 27096 INJECT SACROILIAC JOINT: CPT | Mod: RT

## 2020-10-26 PROCEDURE — 76000 FLUOROSCOPY <1 HR PHYS/QHP: CPT

## 2020-11-10 ENCOUNTER — APPOINTMENT (OUTPATIENT)
Dept: PHYSICAL MEDICINE AND REHAB | Facility: CLINIC | Age: 80
End: 2020-11-10

## 2020-12-02 NOTE — PHYSICAL THERAPY INITIAL EVALUATION ADULT - ASSISTIVE DEVICE FOR TRANSFER: SIT/STAND, REHAB EVAL
Next appt NONE  Last appt 9/14/20    Refill request for  Disp Refills Start End     FLUoxetine (PROzac) 10 MG capsule 90 capsule 1 10/5/2020     Sig - Route: Take 3 capsules by mouth daily. - Oral      Refill unable to be completed per standing protocol due to; NO FUTURE APPT  Orders pended, and routed to provider for approval.  Please route any notes back to your nursing pool via patient call, NOT Rx Auth.    Thank you, Refill Center Staff    
Left another message for patient to call back and schedule fu  
Left message for patient to call back and schedule a fu with Malena   
PSR's - Patient requesting medication refill Fluoxetine/ [facility]. Patient is due to see provider for anxiety f/u - 20 min can be video, please call to schedule, once scheduled please route back to nursing pool so refill can be processed. Thank you.     
Patient has not returned call, will close encounter for now.   
Please try patient again.   
straight cane

## 2021-01-04 ENCOUNTER — APPOINTMENT (OUTPATIENT)
Dept: RHEUMATOLOGY | Facility: CLINIC | Age: 81
End: 2021-01-04

## 2021-03-09 ENCOUNTER — APPOINTMENT (OUTPATIENT)
Dept: RHEUMATOLOGY | Facility: CLINIC | Age: 81
End: 2021-03-09
Payer: MEDICARE

## 2021-03-09 VITALS
OXYGEN SATURATION: 95 % | RESPIRATION RATE: 17 BRPM | WEIGHT: 215 LBS | SYSTOLIC BLOOD PRESSURE: 138 MMHG | HEIGHT: 61 IN | BODY MASS INDEX: 40.59 KG/M2 | HEART RATE: 83 BPM | DIASTOLIC BLOOD PRESSURE: 76 MMHG | TEMPERATURE: 98.2 F

## 2021-03-09 PROCEDURE — 36415 COLL VENOUS BLD VENIPUNCTURE: CPT

## 2021-03-09 PROCEDURE — 81003 URINALYSIS AUTO W/O SCOPE: CPT | Mod: QW

## 2021-03-09 PROCEDURE — G2212 PROLONG OUTPT/OFFICE VIS: CPT

## 2021-03-09 PROCEDURE — 99215 OFFICE O/P EST HI 40 MIN: CPT | Mod: 25

## 2021-03-09 RX ORDER — ATORVASTATIN CALCIUM 10 MG/1
10 TABLET, FILM COATED ORAL
Qty: 90 | Refills: 0 | Status: DISCONTINUED | COMMUNITY
Start: 2020-04-14 | End: 2021-03-09

## 2021-03-09 RX ORDER — LIDOCAINE 5% 700 MG/1
5 PATCH TOPICAL DAILY
Qty: 30 | Refills: 0 | Status: DISCONTINUED | COMMUNITY
Start: 2020-08-28 | End: 2021-03-09

## 2021-03-13 LAB
ALBUMIN SERPL ELPH-MCNC: 4.5 G/DL
ALP BLD-CCNC: 111 U/L
ALT SERPL-CCNC: 12 U/L
ANION GAP SERPL CALC-SCNC: 11 MMOL/L
AST SERPL-CCNC: 12 U/L
BASOPHILS # BLD AUTO: 0.03 K/UL
BASOPHILS NFR BLD AUTO: 0.5 %
BILIRUB SERPL-MCNC: 0.3 MG/DL
BILIRUB UR QL STRIP: NEGATIVE
BUN SERPL-MCNC: 36 MG/DL
CALCIUM SERPL-MCNC: 9.8 MG/DL
CHLORIDE SERPL-SCNC: 105 MMOL/L
CO2 SERPL-SCNC: 29 MMOL/L
CREAT SERPL-MCNC: 1.1 MG/DL
CRP SERPL-MCNC: 3 MG/L
DEPRECATED KAPPA LC FREE/LAMBDA SER: 1.63 RATIO
ENA SS-A AB SER IA-ACNC: <0.2 AL
ENA SS-B AB SER IA-ACNC: <0.2 AL
EOSINOPHIL # BLD AUTO: 0.19 K/UL
EOSINOPHIL NFR BLD AUTO: 2.9 %
ERYTHROCYTE [SEDIMENTATION RATE] IN BLOOD BY WESTERGREN METHOD: 25 MM/HR
FOLATE SERPL-MCNC: 17.7 NG/ML
GLUCOSE SERPL-MCNC: 90 MG/DL
GLUCOSE UR-MCNC: NEGATIVE
HCG UR QL: 0.2 EU/DL
HCT VFR BLD CALC: 45.3 %
HGB BLD-MCNC: 14.4 G/DL
HGB UR QL STRIP.AUTO: NEGATIVE
IGA SER QL IEP: 238 MG/DL
IGG SER QL IEP: 860 MG/DL
IGM SER QL IEP: 280 MG/DL
IMM GRANULOCYTES NFR BLD AUTO: 0.3 %
KAPPA LC CSF-MCNC: 2.17 MG/DL
KAPPA LC SERPL-MCNC: 3.53 MG/DL
KETONES UR-MCNC: NEGATIVE
LDH SERPL-CCNC: 171 U/L
LEUKOCYTE ESTERASE UR QL STRIP: NEGATIVE
LYMPHOCYTES # BLD AUTO: 1.81 K/UL
LYMPHOCYTES NFR BLD AUTO: 27.5 %
MAN DIFF?: NORMAL
MCHC RBC-ENTMCNC: 31.8 GM/DL
MCHC RBC-ENTMCNC: 32.5 PG
MCV RBC AUTO: 102.3 FL
MONOCYTES # BLD AUTO: 0.52 K/UL
MONOCYTES NFR BLD AUTO: 7.9 %
NEUTROPHILS # BLD AUTO: 4.01 K/UL
NEUTROPHILS NFR BLD AUTO: 60.9 %
NITRITE UR QL STRIP: NEGATIVE
PH UR STRIP: 5
PHOSPHATE SERPL-MCNC: 4.3 MG/DL
PLATELET # BLD AUTO: 231 K/UL
POTASSIUM SERPL-SCNC: 4.4 MMOL/L
PROT SERPL-MCNC: 6.9 G/DL
PROT UR STRIP-MCNC: NEGATIVE
RBC # BLD: 4.43 M/UL
RBC # FLD: 13.5 %
SODIUM SERPL-SCNC: 145 MMOL/L
SP GR UR STRIP: 1.02
TSH SERPL-ACNC: 2.01 UIU/ML
VIT B12 SERPL-MCNC: 1054 PG/ML
WBC # FLD AUTO: 6.58 K/UL

## 2021-03-23 LAB — M PROTEIN SPEC IFE-MCNC: NORMAL

## 2021-06-15 ENCOUNTER — APPOINTMENT (OUTPATIENT)
Dept: RHEUMATOLOGY | Facility: CLINIC | Age: 81
End: 2021-06-15

## 2021-08-24 ENCOUNTER — RESULT REVIEW (OUTPATIENT)
Age: 81
End: 2021-08-24

## 2021-10-05 ENCOUNTER — LABORATORY RESULT (OUTPATIENT)
Age: 81
End: 2021-10-05

## 2021-10-05 ENCOUNTER — APPOINTMENT (OUTPATIENT)
Dept: RHEUMATOLOGY | Facility: CLINIC | Age: 81
End: 2021-10-05
Payer: MEDICARE

## 2021-10-05 VITALS
OXYGEN SATURATION: 97 % | RESPIRATION RATE: 17 BRPM | HEIGHT: 61 IN | HEART RATE: 68 BPM | BODY MASS INDEX: 39.46 KG/M2 | WEIGHT: 209 LBS | TEMPERATURE: 97.9 F | DIASTOLIC BLOOD PRESSURE: 60 MMHG | SYSTOLIC BLOOD PRESSURE: 128 MMHG

## 2021-10-05 DIAGNOSIS — G89.29 LUMBAGO WITH SCIATICA, RIGHT SIDE: ICD-10-CM

## 2021-10-05 DIAGNOSIS — Z87.39 PERSONAL HISTORY OF OTHER DISEASES OF THE MUSCULOSKELETAL SYSTEM AND CONNECTIVE TISSUE: ICD-10-CM

## 2021-10-05 DIAGNOSIS — R20.2 PARESTHESIA OF SKIN: ICD-10-CM

## 2021-10-05 DIAGNOSIS — M54.41 LUMBAGO WITH SCIATICA, RIGHT SIDE: ICD-10-CM

## 2021-10-05 PROCEDURE — G2212 PROLONG OUTPT/OFFICE VIS: CPT

## 2021-10-05 PROCEDURE — 99215 OFFICE O/P EST HI 40 MIN: CPT | Mod: 25

## 2021-10-05 PROCEDURE — 36415 COLL VENOUS BLD VENIPUNCTURE: CPT

## 2021-10-05 RX ORDER — SULINDAC 150 MG/1
150 TABLET ORAL
Qty: 180 | Refills: 0 | Status: DISCONTINUED | COMMUNITY
Start: 2020-06-17 | End: 2021-10-05

## 2021-10-10 LAB
ALBUMIN SERPL ELPH-MCNC: 4 G/DL
ALP BLD-CCNC: 82 U/L
ALT SERPL-CCNC: 13 U/L
ANION GAP SERPL CALC-SCNC: 14 MMOL/L
AST SERPL-CCNC: 12 U/L
B2 GLYCOPROT1 AB SER QL: NEGATIVE
BASOPHILS # BLD AUTO: 0.05 K/UL
BASOPHILS NFR BLD AUTO: 0.7 %
BILIRUB SERPL-MCNC: 0.4 MG/DL
BUN SERPL-MCNC: 27 MG/DL
CALCIUM SERPL-MCNC: 9.3 MG/DL
CARDIOLIPIN AB SER IA-ACNC: POSITIVE
CHLORIDE SERPL-SCNC: 104 MMOL/L
CK SERPL-CCNC: 52 U/L
CO2 SERPL-SCNC: 26 MMOL/L
CREAT SERPL-MCNC: 1.06 MG/DL
CRP SERPL-MCNC: <3 MG/L
ENA SS-A AB SER IA-ACNC: <0.2 AL
ENA SS-B AB SER IA-ACNC: <0.2 AL
EOSINOPHIL # BLD AUTO: 0.25 K/UL
EOSINOPHIL NFR BLD AUTO: 3.6 %
ERYTHROCYTE [SEDIMENTATION RATE] IN BLOOD BY WESTERGREN METHOD: 35 MM/HR
GLUCOSE SERPL-MCNC: 89 MG/DL
HCT VFR BLD CALC: 44.6 %
HGB BLD-MCNC: 14.2 G/DL
IMM GRANULOCYTES NFR BLD AUTO: 0.6 %
LYMPHOCYTES # BLD AUTO: 1.82 K/UL
LYMPHOCYTES NFR BLD AUTO: 26.6 %
MAN DIFF?: NORMAL
MCHC RBC-ENTMCNC: 31.8 GM/DL
MCHC RBC-ENTMCNC: 32.6 PG
MCV RBC AUTO: 102.3 FL
MONOCYTES # BLD AUTO: 0.53 K/UL
MONOCYTES NFR BLD AUTO: 7.7 %
NEUTROPHILS # BLD AUTO: 4.16 K/UL
NEUTROPHILS NFR BLD AUTO: 60.8 %
PHOSPHATE SERPL-MCNC: 3.7 MG/DL
PLATELET # BLD AUTO: 240 K/UL
POTASSIUM SERPL-SCNC: 4 MMOL/L
PROT SERPL-MCNC: 6.4 G/DL
RBC # BLD: 4.36 M/UL
RBC # FLD: 13.8 %
SODIUM SERPL-SCNC: 143 MMOL/L
WBC # FLD AUTO: 6.85 K/UL

## 2021-10-11 ENCOUNTER — APPOINTMENT (OUTPATIENT)
Dept: MRI IMAGING | Facility: CLINIC | Age: 81
End: 2021-10-11

## 2021-12-23 ENCOUNTER — NON-APPOINTMENT (OUTPATIENT)
Age: 81
End: 2021-12-23

## 2022-01-05 ENCOUNTER — NON-APPOINTMENT (OUTPATIENT)
Age: 82
End: 2022-01-05

## 2022-01-27 NOTE — PHYSICAL THERAPY INITIAL EVALUATION ADULT - THERAPY FREQUENCY, PT EVAL
27-Jan-2022 14:53 Pt is functionally at baseline level of function and not in need of skilled PT, will no longer follow

## 2022-02-18 ENCOUNTER — APPOINTMENT (OUTPATIENT)
Dept: RHEUMATOLOGY | Facility: CLINIC | Age: 82
End: 2022-02-18
Payer: MEDICARE

## 2022-02-18 VITALS
DIASTOLIC BLOOD PRESSURE: 80 MMHG | TEMPERATURE: 97.7 F | WEIGHT: 215 LBS | RESPIRATION RATE: 17 BRPM | BODY MASS INDEX: 40.59 KG/M2 | SYSTOLIC BLOOD PRESSURE: 140 MMHG | HEIGHT: 61 IN

## 2022-02-18 DIAGNOSIS — Z85.3 PERSONAL HISTORY OF MALIGNANT NEOPLASM OF BREAST: ICD-10-CM

## 2022-02-18 PROCEDURE — 99215 OFFICE O/P EST HI 40 MIN: CPT | Mod: 25

## 2022-02-20 LAB
ALBUMIN SERPL ELPH-MCNC: 3.9 G/DL
ALP BLD-CCNC: 87 U/L
ALT SERPL-CCNC: 12 U/L
ANION GAP SERPL CALC-SCNC: 11 MMOL/L
AST SERPL-CCNC: 17 U/L
BASOPHILS # BLD AUTO: 0.04 K/UL
BASOPHILS NFR BLD AUTO: 0.7 %
BILIRUB SERPL-MCNC: 0.5 MG/DL
BUN SERPL-MCNC: 25 MG/DL
CALCIUM SERPL-MCNC: 9.4 MG/DL
CHLORIDE SERPL-SCNC: 106 MMOL/L
CO2 SERPL-SCNC: 27 MMOL/L
CREAT SERPL-MCNC: 0.9 MG/DL
CRP SERPL-MCNC: 5 MG/L
EOSINOPHIL # BLD AUTO: 0.33 K/UL
EOSINOPHIL NFR BLD AUTO: 5.8 %
ERYTHROCYTE [SEDIMENTATION RATE] IN BLOOD BY WESTERGREN METHOD: 42 MM/HR
GLUCOSE SERPL-MCNC: 92 MG/DL
HCT VFR BLD CALC: 42.5 %
HGB BLD-MCNC: 13.6 G/DL
IMM GRANULOCYTES NFR BLD AUTO: 0.4 %
LDH SERPL-CCNC: 180 U/L
LYMPHOCYTES # BLD AUTO: 1.67 K/UL
LYMPHOCYTES NFR BLD AUTO: 29.5 %
MAN DIFF?: NORMAL
MCHC RBC-ENTMCNC: 32 GM/DL
MCHC RBC-ENTMCNC: 32.3 PG
MCV RBC AUTO: 101 FL
MONOCYTES # BLD AUTO: 0.47 K/UL
MONOCYTES NFR BLD AUTO: 8.3 %
NEUTROPHILS # BLD AUTO: 3.14 K/UL
NEUTROPHILS NFR BLD AUTO: 55.3 %
PHOSPHATE SERPL-MCNC: 3.1 MG/DL
PLATELET # BLD AUTO: 223 K/UL
POTASSIUM SERPL-SCNC: 4.3 MMOL/L
PROT SERPL-MCNC: 6.5 G/DL
RBC # BLD: 4.21 M/UL
RBC # FLD: 13.7 %
SODIUM SERPL-SCNC: 144 MMOL/L
WBC # FLD AUTO: 5.67 K/UL

## 2022-02-23 LAB
ENA SS-A AB SER IA-ACNC: <0.2 AL
ENA SS-B AB SER IA-ACNC: <0.2 AL

## 2022-02-28 ENCOUNTER — NON-APPOINTMENT (OUTPATIENT)
Age: 82
End: 2022-02-28

## 2022-02-28 PROBLEM — Z85.3 HISTORY OF BREAST CANCER: Status: RESOLVED | Noted: 2022-02-28 | Resolved: 2022-02-28

## 2022-02-28 RX ORDER — TIZANIDINE 2 MG/1
2 TABLET ORAL
Qty: 270 | Refills: 0 | Status: DISCONTINUED | COMMUNITY
Start: 2021-10-05 | End: 2022-02-28

## 2022-02-28 RX ORDER — ETODOLAC 500 MG/1
500 TABLET, FILM COATED ORAL DAILY
Qty: 60 | Refills: 0 | Status: DISCONTINUED | COMMUNITY
Start: 2021-12-23 | End: 2022-02-28

## 2022-02-28 RX ORDER — ORPHENADRINE CITRATE 100 MG/1
100 TABLET, EXTENDED RELEASE ORAL DAILY
Qty: 60 | Refills: 0 | Status: DISCONTINUED | COMMUNITY
Start: 2021-12-23 | End: 2022-02-28

## 2022-02-28 NOTE — CONSULT LETTER
[Dear  ___] : Dear  [unfilled], [Consult Letter:] : I had the pleasure of evaluating your patient, [unfilled]. [Please see my note below.] : Please see my note below. [Consult Closing:] : Thank you very much for allowing me to participate in the care of this patient.  If you have any questions, please do not hesitate to contact me. [Sincerely,] : Sincerely, [FreeTextEntry3] : Popeye\par Myron I. Kleiner, M.D., FACR \par Chief, Division of Rheumatology\par Department of Medicine \par [facility]

## 2022-02-28 NOTE — PHYSICAL EXAM
[General Appearance - Alert] : alert [General Appearance - In No Acute Distress] : in no acute distress [General Appearance - Well Nourished] : well nourished [General Appearance - Well Developed] : well developed [General Appearance - Well-Appearing] : healthy appearing [PERRL With Normal Accommodation] : pupils were equal in size, round, and reactive to light [Extraocular Movements] : extraocular movements were intact [Strabismus] : no strabismus was seen [Optic Disc Abnormality] : the optic disc were normal in size and color [Retina] : no retinal hemorrhages, vessel changes or exudates seen on fundoscopic exam [Outer Ear] : the ears and nose were normal in appearance [Neck Appearance] : the appearance of the neck was normal [Neck Cervical Mass (___cm)] : no neck mass was observed [Jugular Venous Distention Increased] : there was no jugular-venous distention [Thyroid Diffuse Enlargement] : the thyroid was not enlarged [Thyroid Nodule] : there were no palpable thyroid nodules [Lungs Percussion] : the lungs were normal to percussion [Heart Rate And Rhythm] : heart rate was normal and rhythm regular [Murmurs] : no murmurs [Heart Sounds Pericardial Friction Rub] : no pericardial rub [Edema] : there was no peripheral edema [Bowel Sounds] : normal bowel sounds [Abdomen Soft] : soft [Abdomen Tenderness] : non-tender [Abdomen Mass (___ Cm)] : no abdominal mass palpated [Cervical Lymph Nodes Enlarged Posterior Bilaterally] : posterior cervical [Cervical Lymph Nodes Enlarged Anterior Bilaterally] : anterior cervical [Supraclavicular Lymph Nodes Enlarged Bilaterally] : supraclavicular [Axillary Lymph Nodes Enlarged Bilaterally] : axillary [No CVA Tenderness] : no ~M costovertebral angle tenderness [No Spinal Tenderness] : no spinal tenderness [Abnormal Walk] : normal gait [Nail Clubbing] : no clubbing  or cyanosis of the fingernails [Musculoskeletal - Swelling] : no joint swelling seen [Motor Tone] : muscle strength and tone were normal [Skin Color & Pigmentation] : normal skin color and pigmentation [Skin Turgor] : normal skin turgor [] : no rash [Cranial Nerves] : cranial nerves 2-12 were intact [Deep Tendon Reflexes (DTR)] : deep tendon reflexes were 2+ and symmetric [Motor Exam] : the motor exam was normal [Sensation] : the sensory exam was normal to light touch and pinprick [No Focal Deficits] : no focal deficits [Oriented To Time, Place, And Person] : oriented to person, place, and time [Impaired Insight] : insight and judgment were intact [Affect] : the affect was normal [Mood] : the mood was normal [Occult Blood Positive] : stool was negative for occult blood [FreeTextEntry1] : Strength-5/5

## 2022-02-28 NOTE — ASSESSMENT
[FreeTextEntry1] : Impression: BARBI BLANCO is a 81 year old woman who presents for follow up office visit for further evaluation of joint symptoms and rheumatic diseases including osteoarthritis, Sjogren's syndrome, bilateral carpal tunnel syndrome, osteopenia, accompanied by her son-in-law Patrice.\par \par Patient has persistent neck pain without radiation secondary to osteoarthritis and muscle spasms as seen on recent x-ray results. She denies other joint pain at this time.. Recent lab tests revealed inflammation otherwise all other results were normal. 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 She has discontinued Etodolac and Orphenadrine secondary to stopping for upcoming additional breast surgery for her right breast cancer. Medication gave some relief before stopping. Patient denies rash or side effects with current medications. Patient is content with current medication regimen. \par \par Plan: I reviewed recent lab results with patient and son-in-law with extensive discussion\par I reviewed recent x-ray results with patient and son-in-law with extensive discussion\par Laboratory tests ordered today-see list below\par CAT scan cervical spine ordered\par Diagnosis and prognosis discussed\par Continue current medications (other than those changed below)\par Stay off Etodolac\par Stay off Orphenadrine\par When cleared by surgeon, start Nabumetone 500 mg b.i.d. end of breakfast and supper (Possible side effects explained including cardiovascular risk/MI/CVA) \par When cleared by surgeon, continue Prophylactic aspirin 81 mg every other day at end of lunch (Possible side effects explained) \par When cleared by surgeon, Methocarbamol 750 mg q.d. end of supper (Possible side effects explained) \par Pain Management -  \par Return visit 4 months

## 2022-02-28 NOTE — ADDENDUM
[FreeTextEntry1] : I, Adelita Ocampo, acted solely as a scribe for Dr. Myron I. Kleiner, MD. on 02/18/2022 .

## 2022-02-28 NOTE — HISTORY OF PRESENT ILLNESS
[FreeTextEntry1] : BARBI BLANCO is a 81 year old woman who presents for follow up office visit for further evaluation of joint symptoms and rheumatic diseases including osteoarthritis, Sjogren's syndrome, osteopenia, bilateral carpal tunnel syndrome, accompanied by her son-in-law Patrice.\par \par Patient has persistent neck pain without radiation. She denies other joint pain. She has discontinued Etodolac and Orphenadrine secondary to stopping for surgery. Medication gave some relief before stopping. Patient denies rash or side effects with current medications. Patient is content with current medication regimen. \par \par PMH:\par January 20, 2022 - Hospitalized [facility] right partial mastectomy - further surgery planned in 6 days with radiation therapy thereafter

## 2022-04-06 ENCOUNTER — NON-APPOINTMENT (OUTPATIENT)
Age: 82
End: 2022-04-06

## 2022-04-19 ENCOUNTER — NON-APPOINTMENT (OUTPATIENT)
Age: 82
End: 2022-04-19

## 2022-04-21 ENCOUNTER — NON-APPOINTMENT (OUTPATIENT)
Age: 82
End: 2022-04-21

## 2022-04-22 DIAGNOSIS — Z86.79 PERSONAL HISTORY OF OTHER DISEASES OF THE CIRCULATORY SYSTEM: ICD-10-CM

## 2022-04-22 DIAGNOSIS — E78.5 HYPERLIPIDEMIA, UNSPECIFIED: ICD-10-CM

## 2022-05-10 ENCOUNTER — NON-APPOINTMENT (OUTPATIENT)
Age: 82
End: 2022-05-10

## 2022-05-10 ENCOUNTER — APPOINTMENT (OUTPATIENT)
Dept: RADIATION ONCOLOGY | Facility: CLINIC | Age: 82
End: 2022-05-10
Payer: MEDICARE

## 2022-05-12 RX ORDER — CYCLOBENZAPRINE HYDROCHLORIDE 5 MG/1
5 TABLET, FILM COATED ORAL
Qty: 90 | Refills: 0 | Status: COMPLETED | COMMUNITY
Start: 2022-04-20 | End: 2022-05-12

## 2022-05-12 RX ORDER — DESONIDE 0.5 MG/G
0.05 CREAM TOPICAL
Qty: 15 | Refills: 0 | Status: COMPLETED | COMMUNITY
Start: 2018-04-23 | End: 2022-05-12

## 2022-05-19 ENCOUNTER — NON-APPOINTMENT (OUTPATIENT)
Age: 82
End: 2022-05-19

## 2022-05-24 ENCOUNTER — APPOINTMENT (OUTPATIENT)
Dept: RADIATION ONCOLOGY | Facility: CLINIC | Age: 82
End: 2022-05-24
Payer: MEDICARE

## 2022-05-24 ENCOUNTER — NON-APPOINTMENT (OUTPATIENT)
Age: 82
End: 2022-05-24

## 2022-05-24 VITALS
SYSTOLIC BLOOD PRESSURE: 157 MMHG | DIASTOLIC BLOOD PRESSURE: 79 MMHG | BODY MASS INDEX: 40.44 KG/M2 | RESPIRATION RATE: 16 BRPM | HEART RATE: 80 BPM | WEIGHT: 214 LBS | OXYGEN SATURATION: 97 %

## 2022-05-24 DIAGNOSIS — C50.111 MALIGNANT NEOPLASM OF CENTRAL PORTION OF RIGHT FEMALE BREAST: ICD-10-CM

## 2022-05-24 DIAGNOSIS — Z17.0 MALIGNANT NEOPLASM OF CENTRAL PORTION OF RIGHT FEMALE BREAST: ICD-10-CM

## 2022-05-24 DIAGNOSIS — Z80.8 FAMILY HISTORY OF MALIGNANT NEOPLASM OF OTHER ORGANS OR SYSTEMS: ICD-10-CM

## 2022-05-24 DIAGNOSIS — Z80.0 FAMILY HISTORY OF MALIGNANT NEOPLASM OF DIGESTIVE ORGANS: ICD-10-CM

## 2022-05-24 PROCEDURE — 99205 OFFICE O/P NEW HI 60 MIN: CPT | Mod: 25

## 2022-05-24 RX ORDER — GUAIFENESIN AND CODEINE PHOSPHATE 10; 100 MG/5ML; MG/5ML
100-10 SOLUTION ORAL
Qty: 118 | Refills: 0 | Status: COMPLETED | COMMUNITY
Start: 2022-02-24

## 2022-05-24 RX ORDER — DOXYCYCLINE HYCLATE 100 MG/1
100 CAPSULE ORAL
Qty: 14 | Refills: 0 | Status: COMPLETED | COMMUNITY
Start: 2022-02-26

## 2022-05-24 RX ORDER — OXYCODONE AND ACETAMINOPHEN 5; 325 MG/1; MG/1
5-325 TABLET ORAL
Qty: 20 | Refills: 0 | Status: COMPLETED | COMMUNITY
Start: 2022-01-21

## 2022-05-24 NOTE — VITALS
[Maximal Pain Intensity: 0/10] : 0/10 [Least Pain Intensity: 0/10] : 0/10 [80: Normal activity with effort; some signs or symptoms of disease.] : 80: Normal activity with effort; some signs or symptoms of disease.  [Date: ____________] : Patient's last distress assessment performed on [unfilled]. [Patient Refusal] : Patient refused psychosocial distress assessment

## 2022-05-24 NOTE — HISTORY OF PRESENT ILLNESS
[FreeTextEntry1] : 81 year old woman presents today for consultation regarding radiation therapy for breast carcinoma.\par \par She underwent bilateral screening mammogram on 7/7/21, which noted new focal asymmetry in the anterior right breast.  \par BI-RADS 0.\par \par 7/21/21 right diagnostic callback mammogram and breast ultrasound showed persistence of a focal asymmetry in the right breast anterior-mid upper, with no correlate on ultrasound.  Felt to represent radial scar.  BI-RADS 4.\par \par 8/24/21 core biopsy of the right breast mid-upper showed invasive ductal carcinoma, grade 2, with DCIS.  ER >90%, ID >75%, HER2 negative.\par \par 11/30/21 bilateral diagnostic mammogram and left breast ultrasound showed stable biopsy-proven mass in the right breast 12:00, with left superior central breast asymmetry corresponding to known surgical scar.\par \par She met with Dr. Cy Brantley ([facility]).\par \par 1/20/22 Right partial mastectomy/SLNB showed IDC measuring 1.2 cm, grade 2, with invasive carcinoma focally abutting superior margin.  Associated DCIS, solid/cribriform type, 0.3 cm, margins negative.  One right sentinel lymph node excised, negative (0/1).  pT1c pN0.\par \par 2/24/22 re-excision showed atypical lobular hyperplasia, and negative for residual carcinoma.\par \par Post-operatively, she had drainage from the incision site and was placed on a course of doxycycline.\par \par She met with Dr. Anna Rose to discuss adjuvant systemic therapy recommendations.\par \par Denies breast pain, breast edema, itch, nipple discharge, arm edema, fatigue or weight loss

## 2022-05-24 NOTE — PHYSICAL EXAM
[General Appearance - Well Developed] : well developed [General Appearance - Well Nourished] : well nourished [General Appearance - Alert] : alert [General Appearance - In No Acute Distress] : in no acute distress [Outer Ear] : the ears and nose were normal in appearance [] : no respiratory distress [Respiration, Rhythm And Depth] : normal respiratory rhythm and effort [Exaggerated Use Of Accessory Muscles For Inspiration] : no accessory muscle use [Heart Rate And Rhythm] : heart rate and rhythm were normal [Heart Sounds] : normal S1 and S2 [Breast Palpation Mass] : no palpable masses [No UE Edema] : there is no upper extremity edema [Normal] : oriented to person, place and time, the affect was normal, the mood was normal and not anxious [de-identified] : decreased but clear [de-identified] : ptosis.  Well-helaed right breast incision.  Mild erythema of the right breast. [de-identified] : multiple nevi throughout breast/chest/abdomen/back [de-identified] : ambulates with walker

## 2022-05-24 NOTE — REVIEW OF SYSTEMS
[Negative] : Allergic/Immunologic [Edema Limbs: Grade 0] : Edema Limbs: Grade 0  [Fatigue: Grade 0] : Fatigue: Grade 0 [Localized Edema: Grade 0] : Localized Edema: Grade 0  [Neck Edema: Grade 0] : Neck Edema: Grade 0 [Breast Pain: Grade 0] : Breast Pain: Grade 0 [FreeTextEntry3] : left eye enucleation  [FreeTextEntry9] : uses walker & cane

## 2022-05-24 NOTE — LETTER CLOSING
[Consult Closing:] : Thank you for allowing me to participate in the care of this patient.  If you have any questions, please do not hesitate to contact me. [Sincerely yours,] : Sincerely yours, [FreeTextEntry3] : Jos Chavira MD\par  of Radiation Medicine, Quality and Safety\par  of Radiation Medicine\par [facility] [employer] [facility]\par [facility]\par [facility]\par

## 2022-06-07 ENCOUNTER — APPOINTMENT (OUTPATIENT)
Dept: RHEUMATOLOGY | Facility: CLINIC | Age: 82
End: 2022-06-07
Payer: MEDICARE

## 2022-06-07 VITALS
SYSTOLIC BLOOD PRESSURE: 118 MMHG | RESPIRATION RATE: 17 BRPM | OXYGEN SATURATION: 98 % | HEART RATE: 80 BPM | BODY MASS INDEX: 38.71 KG/M2 | HEIGHT: 61 IN | TEMPERATURE: 98 F | DIASTOLIC BLOOD PRESSURE: 70 MMHG | WEIGHT: 205 LBS

## 2022-06-07 DIAGNOSIS — Z86.69 PERSONAL HISTORY OF OTHER DISEASES OF THE NERVOUS SYSTEM AND SENSE ORGANS: ICD-10-CM

## 2022-06-07 PROCEDURE — 99214 OFFICE O/P EST MOD 30 MIN: CPT | Mod: 25

## 2022-06-07 PROCEDURE — 36415 COLL VENOUS BLD VENIPUNCTURE: CPT

## 2022-06-07 RX ORDER — METHOCARBAMOL 750 MG/1
750 TABLET, FILM COATED ORAL
Qty: 90 | Refills: 0 | Status: DISCONTINUED | COMMUNITY
Start: 2022-02-28 | End: 2022-06-07

## 2022-06-07 NOTE — REVIEW OF SYSTEMS
[Dry Eyes] : no dryness of the eyes [As Noted in HPI] : as noted in HPI [Joint Pain] : joint pain [Negative] : Neurological

## 2022-06-09 ENCOUNTER — RESULT REVIEW (OUTPATIENT)
Age: 82
End: 2022-06-09

## 2022-06-19 ENCOUNTER — RX RENEWAL (OUTPATIENT)
Age: 82
End: 2022-06-19

## 2022-06-24 ENCOUNTER — NON-APPOINTMENT (OUTPATIENT)
Age: 82
End: 2022-06-24

## 2022-06-27 PROBLEM — Z86.69 HISTORY OF MACULAR DEGENERATION: Status: RESOLVED | Noted: 2022-06-27 | Resolved: 2022-06-27

## 2022-06-27 LAB
ALBUMIN SERPL ELPH-MCNC: 4.1 G/DL
ALP BLD-CCNC: 102 U/L
ALT SERPL-CCNC: 12 U/L
ANION GAP SERPL CALC-SCNC: 12 MMOL/L
AST SERPL-CCNC: 15 U/L
BASOPHILS # BLD AUTO: 0.04 K/UL
BASOPHILS NFR BLD AUTO: 0.6 %
BILIRUB SERPL-MCNC: 0.3 MG/DL
BUN SERPL-MCNC: 35 MG/DL
CALCIUM SERPL-MCNC: 9.6 MG/DL
CHLORIDE SERPL-SCNC: 105 MMOL/L
CO2 SERPL-SCNC: 27 MMOL/L
CREAT SERPL-MCNC: 1.12 MG/DL
CRP SERPL-MCNC: 18 MG/L
EGFR: 49 ML/MIN/1.73M2
ENA SS-A AB SER IA-ACNC: <0.2 AL
ENA SS-B AB SER IA-ACNC: <0.2 AL
EOSINOPHIL # BLD AUTO: 0.34 K/UL
EOSINOPHIL NFR BLD AUTO: 5.2 %
ERYTHROCYTE [SEDIMENTATION RATE] IN BLOOD BY WESTERGREN METHOD: 41 MM/HR
GLUCOSE SERPL-MCNC: 128 MG/DL
HCT VFR BLD CALC: 43 %
HGB BLD-MCNC: 13.4 G/DL
IMM GRANULOCYTES NFR BLD AUTO: 0.6 %
LDH SERPL-CCNC: 186 U/L
LYMPHOCYTES # BLD AUTO: 1.7 K/UL
LYMPHOCYTES NFR BLD AUTO: 26.2 %
MAN DIFF?: NORMAL
MCHC RBC-ENTMCNC: 31.2 GM/DL
MCHC RBC-ENTMCNC: 31.4 PG
MCV RBC AUTO: 100.7 FL
MONOCYTES # BLD AUTO: 0.44 K/UL
MONOCYTES NFR BLD AUTO: 6.8 %
NEUTROPHILS # BLD AUTO: 3.92 K/UL
NEUTROPHILS NFR BLD AUTO: 60.6 %
PHOSPHATE SERPL-MCNC: 2.9 MG/DL
PLATELET # BLD AUTO: 234 K/UL
POTASSIUM SERPL-SCNC: 4.2 MMOL/L
PROT SERPL-MCNC: 6.5 G/DL
RBC # BLD: 4.27 M/UL
RBC # FLD: 14.2 %
SODIUM SERPL-SCNC: 144 MMOL/L
WBC # FLD AUTO: 6.48 K/UL

## 2022-06-27 NOTE — CONSULT LETTER
[Dear  ___] : Dear  [unfilled], [Consult Letter:] : I had the pleasure of evaluating your patient, [unfilled]. [Please see my note below.] : Please see my note below. [Consult Closing:] : Thank you very much for allowing me to participate in the care of this patient.  If you have any questions, please do not hesitate to contact me. [Sincerely,] : Sincerely, [FreeTextEntry3] : Popeye\par Myron I. Kleiner, M.D., FACR \par Chief, Division of Rheumatology\par Department of Medicine \par [facility]

## 2022-06-27 NOTE — ADDENDUM
[FreeTextEntry1] : I, Mendez Braxton, acted solely as a scribe for Dr. Myron I. Kleiner, MD. on 06/07/2022 .

## 2022-06-27 NOTE — PHYSICAL EXAM
[General Appearance - Alert] : alert [General Appearance - In No Acute Distress] : in no acute distress [General Appearance - Well Nourished] : well nourished [General Appearance - Well Developed] : well developed [General Appearance - Well-Appearing] : healthy appearing [PERRL With Normal Accommodation] : pupils were equal in size, round, and reactive to light [Extraocular Movements] : extraocular movements were intact [Strabismus] : no strabismus was seen [Optic Disc Abnormality] : the optic disc were normal in size and color [Retina] : no retinal hemorrhages, vessel changes or exudates seen on fundoscopic exam [Outer Ear] : the ears and nose were normal in appearance [Neck Appearance] : the appearance of the neck was normal [Neck Cervical Mass (___cm)] : no neck mass was observed [Jugular Venous Distention Increased] : there was no jugular-venous distention [Thyroid Diffuse Enlargement] : the thyroid was not enlarged [Thyroid Nodule] : there were no palpable thyroid nodules [Lungs Percussion] : the lungs were normal to percussion [Heart Rate And Rhythm] : heart rate was normal and rhythm regular [Murmurs] : no murmurs [Heart Sounds Pericardial Friction Rub] : no pericardial rub [Edema] : there was no peripheral edema [Bowel Sounds] : normal bowel sounds [Abdomen Soft] : soft [Abdomen Tenderness] : non-tender [Abdomen Mass (___ Cm)] : no abdominal mass palpated [Cervical Lymph Nodes Enlarged Posterior Bilaterally] : posterior cervical [Cervical Lymph Nodes Enlarged Anterior Bilaterally] : anterior cervical [Supraclavicular Lymph Nodes Enlarged Bilaterally] : supraclavicular [Axillary Lymph Nodes Enlarged Bilaterally] : axillary [No CVA Tenderness] : no ~M costovertebral angle tenderness [No Spinal Tenderness] : no spinal tenderness [Abnormal Walk] : normal gait [Nail Clubbing] : no clubbing  or cyanosis of the fingernails [Musculoskeletal - Swelling] : no joint swelling seen [Motor Tone] : muscle strength and tone were normal [Skin Color & Pigmentation] : normal skin color and pigmentation [Skin Turgor] : normal skin turgor [] : no rash [Cranial Nerves] : cranial nerves 2-12 were intact [Deep Tendon Reflexes (DTR)] : deep tendon reflexes were 2+ and symmetric [Sensation] : the sensory exam was normal to light touch and pinprick [Motor Exam] : the motor exam was normal [No Focal Deficits] : no focal deficits [Oriented To Time, Place, And Person] : oriented to person, place, and time [Impaired Insight] : insight and judgment were intact [Affect] : the affect was normal [Mood] : the mood was normal [Occult Blood Positive] : stool was negative for occult blood [FreeTextEntry1] : Strength-5/5

## 2022-06-27 NOTE — ASSESSMENT
[FreeTextEntry1] : Impression: BARBI BLANCO is a 81 year old woman who presents for follow up office visit for further evaluation of joint symptoms and rheumatic diseases including osteoarthritis, Sjogren's syndrome, osteopenia, bilateral carpal tunnel syndrome, accompanied by her son-in-law Patrice.\par \par Patient reports persistent neck pain without radiation secondary to osteoarthritis. Denies any other recent joint pain secondary to osteoarthritis. She recently restarted nabumetone 500 mg and cyclobenzaprine 10 mg, but it is too soon to report any side effects. The patient continues calcium and vitamin D supplements for her osteopenia. Patient denies rash or side effects with current medications.\par \par Plan: \par I reviewed recent lab results with patient and son-in-law with extensive discussion\par I reviewed recent x-ray results with patient and son-in-law with extensive discussion\par Laboratory tests ordered today-see list below- with coordination of care \par Diagnosis and prognosis discussed\par Continue current medications (other than those changed below)\par Restart nabumetone 500 mg twice daily at end of breakfast and supper (Possible side effects explained including cardiovascular risk/MI/CVA)\par Restart cyclobenzaprine 10 mg p.o. daily at suppertime (Possible side effects explained)\par Artificial tears one drop each eye q.i.d. and p.r.n.(Possible side effects explained)\par Biotin mouthwash/spray q.i.d. and p.r.n.(Possible side effects explained)\par Oral hydration\par Follow-up ophthalmology ---send Opthalmology consult report\par Return visit 4 months

## 2022-06-27 NOTE — HISTORY OF PRESENT ILLNESS
[FreeTextEntry1] : BARBI BLANCO is a 81 year old woman who presents for follow up office visit for further evaluation of joint symptoms and rheumatic diseases including osteoarthritis, Sjogren's syndrome, osteopenia, bilateral carpal tunnel syndrome, accompanied by her son-in-law Patrice.\par \par Patient reports persistent neck pain without radiation. Denies any other recent joint pain. She recently restarted nabumetone 500 mg and cyclobenzaprine 10 mg, but it is too soon to report any side effects. dry eyes and dry mouthThe patient continues calcium and vitamin D supplements. Patient denies rash or side effects with current medications. \par \par PMH:\par - She recently saw Opthalmology secondary to visual disturbance in her good right eye where she was seeing "sparkles", in which he felt it is due to macular degeneration and not medication--prescribed new glasses. 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

## 2022-07-11 ENCOUNTER — EMERGENCY (EMERGENCY)
Facility: HOSPITAL | Age: 82
LOS: 1 days | Discharge: HOME | End: 2022-07-11
Attending: EMERGENCY MEDICINE
Payer: MEDICARE

## 2022-07-11 VITALS
SYSTOLIC BLOOD PRESSURE: 150 MMHG | RESPIRATION RATE: 20 BRPM | DIASTOLIC BLOOD PRESSURE: 74 MMHG | HEART RATE: 66 BPM | OXYGEN SATURATION: 94 %

## 2022-07-11 VITALS
HEART RATE: 56 BPM | TEMPERATURE: 98 F | WEIGHT: 199.96 LBS | OXYGEN SATURATION: 93 % | RESPIRATION RATE: 18 BRPM | DIASTOLIC BLOOD PRESSURE: 62 MMHG | HEIGHT: 64 IN | SYSTOLIC BLOOD PRESSURE: 164 MMHG

## 2022-07-11 PROCEDURE — 99284 EMERGENCY DEPT VISIT MOD MDM: CPT

## 2022-07-11 PROCEDURE — 73564 X-RAY EXAM KNEE 4 OR MORE: CPT

## 2022-07-11 PROCEDURE — 73564 X-RAY EXAM KNEE 4 OR MORE: CPT | Mod: 26,RT

## 2022-07-11 PROCEDURE — 93005 ELECTROCARDIOGRAM TRACING: CPT

## 2022-07-11 PROCEDURE — 99285 EMERGENCY DEPT VISIT HI MDM: CPT | Mod: 25

## 2022-07-11 PROCEDURE — 93971 EXTREMITY STUDY: CPT

## 2022-07-11 PROCEDURE — 93971 EXTREMITY STUDY: CPT | Mod: 26,RT

## 2022-07-11 PROCEDURE — 93010 ELECTROCARDIOGRAM REPORT: CPT

## 2022-07-11 RX ORDER — ACETAMINOPHEN 500 MG
650 TABLET ORAL ONCE
Refills: 0 | Status: COMPLETED | OUTPATIENT
Start: 2022-07-11 | End: 2022-07-11

## 2022-07-11 RX ORDER — LIDOCAINE 4 G/100G
1 CREAM TOPICAL ONCE
Refills: 0 | Status: COMPLETED | OUTPATIENT
Start: 2022-07-11 | End: 2022-07-11

## 2022-07-11 RX ADMIN — LIDOCAINE 1 PATCH: 4 CREAM TOPICAL at 17:32

## 2022-07-11 RX ADMIN — Medication 650 MILLIGRAM(S): at 17:31

## 2022-07-11 RX ADMIN — Medication 650 MILLIGRAM(S): at 17:52

## 2022-07-11 NOTE — ED PROVIDER NOTE - PROGRESS NOTE DETAILS
Patient's labs/imaging unremarkable- states that her pain is radiating from back down R leg. Likely radicular pain. Patient unable to ambulate with steady gait- will call PT and have patient evaluated. Patricia Bennett DO Patient not willing to stay to be evaluated by PT. D/w patient's granddaughter, Payton, who will pick patient up.

## 2022-07-11 NOTE — ED ADULT NURSE NOTE - OBJECTIVE STATEMENT
Pt found sitting on the stretcher doing a cross word puzzle. Pt is A&ox4 c/o pain in the posterior aspect of the right patella non radiating   Pt has full range of motion to all 4 extremities.  NO pain on palpitation positive dorsalis pedis.  Pt denies prior episodes.  Pt has unlabored even respirations.  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  Pt family at the bedside.  No redness or warmth noted to extremities.

## 2022-07-11 NOTE — ED PROVIDER NOTE - NSFOLLOWUPINSTRUCTIONS_ED_ALL_ED_FT
1. Follow up with your primary care physician within 2-3days for reevaluation.  2.  Return to the Emergency Department for worsening, progressive or any other concerning symptoms.       Acute Knee Pain, Adult      Acute knee pain is sudden and may be caused by damage, swelling, or irritation of the muscles and tissues that support the knee. Pain may result from:  •A fall.      •An injury to the knee from twisting motions.      •A hit to the knee.      •Infection.      Acute knee pain may go away on its own with time and rest. If it does not, your health care provider may order tests to find the cause of the pain. These may include:  •Imaging tests, such as an X-ray, MRI, CT scan, or ultrasound.      •Joint aspiration. In this test, fluid is removed from the knee and evaluated.      •Arthroscopy. In this test, a lighted tube is inserted into the knee and an image is projected onto a TV screen.      •Biopsy. In this test, a sample of tissue is removed from the body and studied under a microscope.        Follow these instructions at home:      If you have a knee sleeve or brace:      •Wear the knee sleeve or brace as told by your health care provider. Remove it only as told by your health care provider.      •Loosen it if your toes tingle, become numb, or turn cold and blue.      •Keep it clean.    •If the knee sleeve or brace is not waterproof:  •Do not let it get wet.      •Cover it with a watertight covering when you take a bath or shower.        Activity     •Rest your knee.      • Do not do things that cause pain or make pain worse.      •Avoid high-impact activities or exercises, such as running, jumping rope, or doing jumping jacks.      •Work with a physical therapist to make a safe exercise program, as recommended by your health care provider. Do exercises as told by your physical therapist.        Managing pain, stiffness, and swelling    •If directed, put ice on the affected knee. To do this:  •If you have a removable knee sleeve or brace, remove it as told by your health care provider.      •Put ice in a plastic bag.      •Place a towel between your skin and the bag.      •Leave the ice on for 20 minutes, 2–3 times a day.      •Remove the ice if your skin turns bright red. This is very important. If you cannot feel pain, heat, or cold, you have a greater risk of damage to the area.        •If directed, use an elastic bandage to put pressure (compression) on your injured knee. This may control swelling, give support, and help with discomfort.      •Raise (elevate) your knee above the level of your heart while you are sitting or lying down.      •Sleep with a pillow under your knee.      General instructions     •Take over-the-counter and prescription medicines only as told by your health care provider.      • Do not use any products that contain nicotine or tobacco, such as cigarettes, e-cigarettes, and chewing tobacco. If you need help quitting, ask your health care provider.      •If you are overweight, work with your health care provider and a dietitian to set a weight-loss goal that is healthy and reasonable for you. Extra weight can put pressure on your knee.      •Pay attention to any changes in your symptoms.      •Keep all follow-up visits. This is important.        Contact a health care provider if:    •Your knee pain continues, changes, or gets worse.      •You have a fever along with knee pain.      •Your knee feels warm to the touch or is red.      •Your knee mo or locks up.        Get help right away if:    •Your knee swells, and the swelling becomes worse.      •You cannot move your knee.      •You have severe pain in your knee that cannot be managed with pain medicine.        Summary    •Acute knee pain can be caused by a fall, an injury, an infection, or damage, swelling, or irritation of the tissues that support your knee.      •Your health care provider may perform tests to find out the cause of the pain.      •Pay attention to any changes in your symptoms. Relieve your pain with rest, medicines, light activity, and the use of ice.      •Get help right away if your knee swells, you cannot move your knee, or you have severe pain that cannot be managed with medicine.      This information is not intended to replace advice given to you by your health care provider. Make sure you discuss any questions you have with your health care provider.

## 2022-07-11 NOTE — ED PROVIDER NOTE - PATIENT PORTAL LINK FT
You can access the FollowMyHealth Patient Portal offered by [facility] by registering at the following website: [email or web address]. By joining [email or web address]’s FollowMyHealth portal, you will also be able to view your health information using other applications (apps) compatible with our system. You can access the FollowMyHealth Patient Portal offered by [facility] by registering at the following website: [email or web address]. By joining [employer]’s FollowMyHealth portal, you will also be able to view your health information using other applications (apps) compatible with our system.

## 2022-07-11 NOTE — ED PROVIDER NOTE - OBJECTIVE STATEMENT
80yo female with PMH HTN, gastroesophageal reflux disease, hyperlipidemia presenting with RLE swelling x several days that is intermittent. Denies injury to leg, fevers, chills. States "I have a filter" but denies history of DVT/PE, denies being on AC, and does not know when or why it was placed., Denies recent immobilization, numbness, tingling, or weakness.

## 2022-08-19 ENCOUNTER — APPOINTMENT (OUTPATIENT)
Dept: RHEUMATOLOGY | Facility: CLINIC | Age: 82
End: 2022-08-19

## 2022-10-24 ENCOUNTER — LABORATORY RESULT (OUTPATIENT)
Age: 82
End: 2022-10-24

## 2022-10-24 ENCOUNTER — APPOINTMENT (OUTPATIENT)
Dept: RHEUMATOLOGY | Facility: CLINIC | Age: 82
End: 2022-10-24

## 2022-10-24 VITALS
TEMPERATURE: 97.7 F | HEART RATE: 71 BPM | SYSTOLIC BLOOD PRESSURE: 110 MMHG | WEIGHT: 205 LBS | DIASTOLIC BLOOD PRESSURE: 60 MMHG | BODY MASS INDEX: 38.71 KG/M2 | OXYGEN SATURATION: 98 % | HEIGHT: 61 IN

## 2022-10-24 DIAGNOSIS — M62.838 OTHER MUSCLE SPASM: ICD-10-CM

## 2022-10-24 PROCEDURE — G2212 PROLONG OUTPT/OFFICE VIS: CPT

## 2022-10-24 PROCEDURE — 99215 OFFICE O/P EST HI 40 MIN: CPT | Mod: 25

## 2022-10-29 LAB
ALBUMIN SERPL ELPH-MCNC: 3.8 G/DL
ALP BLD-CCNC: 82 U/L
ALT SERPL-CCNC: 13 U/L
ANION GAP SERPL CALC-SCNC: 13 MMOL/L
APPEARANCE: CLEAR
AST SERPL-CCNC: 15 U/L
BACTERIA: NEGATIVE
BASOPHILS # BLD AUTO: 0.04 K/UL
BASOPHILS NFR BLD AUTO: 0.7 %
BILIRUB SERPL-MCNC: 0.4 MG/DL
BILIRUBIN URINE: NEGATIVE
BLOOD URINE: NEGATIVE
BUN SERPL-MCNC: 27 MG/DL
CALCIUM SERPL-MCNC: 9.7 MG/DL
CHLORIDE SERPL-SCNC: 106 MMOL/L
CK SERPL-CCNC: 63 U/L
CO2 SERPL-SCNC: 26 MMOL/L
COLOR: YELLOW
CREAT SERPL-MCNC: 0.98 MG/DL
CRP SERPL-MCNC: 4 MG/L
EGFR: 57 ML/MIN/1.73M2
ENA SS-A AB SER IA-ACNC: <0.2 AL
ENA SS-B AB SER IA-ACNC: <0.2 AL
EOSINOPHIL # BLD AUTO: 0.17 K/UL
EOSINOPHIL NFR BLD AUTO: 2.8 %
ERYTHROCYTE [SEDIMENTATION RATE] IN BLOOD BY WESTERGREN METHOD: 30 MM/HR
FOLATE SERPL-MCNC: >20 NG/ML
GLUCOSE QUALITATIVE U: NEGATIVE
GLUCOSE SERPL-MCNC: 134 MG/DL
HCT VFR BLD CALC: 41.1 %
HGB BLD-MCNC: 13.1 G/DL
IMM GRANULOCYTES NFR BLD AUTO: 0.3 %
KETONES URINE: NEGATIVE
LDH SERPL-CCNC: 155 U/L
LEUKOCYTE ESTERASE URINE: NEGATIVE
LYMPHOCYTES # BLD AUTO: 1.48 K/UL
LYMPHOCYTES NFR BLD AUTO: 24.1 %
MAGNESIUM SERPL-MCNC: 1.9 MG/DL
MAN DIFF?: NORMAL
MCHC RBC-ENTMCNC: 31.9 GM/DL
MCHC RBC-ENTMCNC: 32.1 PG
MCV RBC AUTO: 100.7 FL
MICROSCOPIC-UA: NORMAL
MONOCYTES # BLD AUTO: 0.42 K/UL
MONOCYTES NFR BLD AUTO: 6.9 %
NEUTROPHILS # BLD AUTO: 4 K/UL
NEUTROPHILS NFR BLD AUTO: 65.2 %
NITRITE URINE: NEGATIVE
PH URINE: 6
PHOSPHATE SERPL-MCNC: 3.5 MG/DL
PLATELET # BLD AUTO: 205 K/UL
POTASSIUM SERPL-SCNC: 4 MMOL/L
PROT SERPL-MCNC: 6.1 G/DL
PROTEIN URINE: NEGATIVE
RBC # BLD: 4.08 M/UL
RBC # FLD: 14.1 %
RED BLOOD CELLS URINE: 1 /HPF
SODIUM SERPL-SCNC: 144 MMOL/L
SPECIFIC GRAVITY URINE: 1.02
SQUAMOUS EPITHELIAL CELLS: >27 /HPF
TSH SERPL-ACNC: 1.59 UIU/ML
URINE COMMENTS: NORMAL
UROBILINOGEN URINE: NORMAL
VIT B12 SERPL-MCNC: 947 PG/ML
WBC # FLD AUTO: 6.13 K/UL
WHITE BLOOD CELLS URINE: 2 /HPF

## 2022-10-29 NOTE — PHYSICAL EXAM
[General Appearance - Alert] : alert [General Appearance - In No Acute Distress] : in no acute distress [General Appearance - Well Developed] : well developed [General Appearance - Well Nourished] : well nourished [General Appearance - Well-Appearing] : healthy appearing [PERRL With Normal Accommodation] : pupils were equal in size, round, and reactive to light [Extraocular Movements] : extraocular movements were intact [Strabismus] : no strabismus was seen [Optic Disc Abnormality] : the optic disc were normal in size and color [Retina] : no retinal hemorrhages, vessel changes or exudates seen on fundoscopic exam [Outer Ear] : the ears and nose were normal in appearance [Neck Appearance] : the appearance of the neck was normal [Neck Cervical Mass (___cm)] : no neck mass was observed [Jugular Venous Distention Increased] : there was no jugular-venous distention [Thyroid Diffuse Enlargement] : the thyroid was not enlarged [Thyroid Nodule] : there were no palpable thyroid nodules [No CVA Tenderness] : no ~M costovertebral angle tenderness [No Spinal Tenderness] : no spinal tenderness [Abnormal Walk] : normal gait [Nail Clubbing] : no clubbing  or cyanosis of the fingernails [Musculoskeletal - Swelling] : no joint swelling seen [Motor Tone] : muscle strength and tone were normal [Cranial Nerves] : cranial nerves 2-12 were intact [Deep Tendon Reflexes (DTR)] : deep tendon reflexes were 2+ and symmetric [Sensation] : the sensory exam was normal to light touch and pinprick [Motor Exam] : the motor exam was normal [No Focal Deficits] : no focal deficits [Oropharynx] : the oropharynx was normal [Lungs Percussion] : the lungs were normal to percussion [Heart Rate And Rhythm] : heart rate was normal and rhythm regular [Edema] : there was no peripheral edema [Abdomen Soft] : soft [Abdomen Tenderness] : non-tender [Abdomen Mass (___ Cm)] : no abdominal mass palpated [Cervical Lymph Nodes Enlarged Posterior Bilaterally] : posterior cervical [Cervical Lymph Nodes Enlarged Anterior Bilaterally] : anterior cervical [Supraclavicular Lymph Nodes Enlarged Bilaterally] : supraclavicular [Axillary Lymph Nodes Enlarged Bilaterally] : axillary [Skin Color & Pigmentation] : normal skin color and pigmentation [Skin Turgor] : normal skin turgor [] : no rash [Oriented To Time, Place, And Person] : oriented to person, place, and time [Impaired Insight] : insight and judgment were intact [Affect] : the affect was normal [Mood] : the mood was normal [FreeTextEntry1] : Strength-5/5

## 2022-10-29 NOTE — ADDENDUM
[FreeTextEntry1] : I, Adelita Ocampo, acted solely as a scribe for Dr. Myron I. Kleiner, MD. on 10/24/2022.

## 2022-10-29 NOTE — ASSESSMENT
[FreeTextEntry1] : Impression: BARBI BLANCO is a 82 year old woman who presents for follow up office visit for further evaluation of joint symptoms and rheumatic diseases including osteoarthritis, Sjogren's syndrome, osteopenia and bilateral carpal tunnel syndrome, accompanied by her son-in-law Patrice.\par \par 1-2 month history of right knee pain - resolved. Patient then developed pain right groin - resolved. Today, she has pain anterior right thigh especially when arising from supine position - consider secondary to LS radiculopathy. She has persistent neck pain without radiation secondary to her osteoarthritis and chronic neck pain. Pain management  planning epidural steroid injection. She has dry eyes on exam, although denies being bothersome consistent with Sjogren's Syndrome, but denies dry mouth. Her bilateral carpal tunnel syndrome is under good control at this time. Did not start Flexeril or Nabumetone last visit secondary to pharmacy issues. The patient continues calcium and vitamin D supplements for her osteopenia.. Patient denies rash or side effects with current medications. Patient is content with current medication regimen. \par \par Plan: I reviewed recent lab results with patient with extensive discussion\par Laboratory tests ordered - see list below - with coordination of care \par X-rays ordered - see list below - with coordination of care \par LS Spine CAT Scan Ordered - with coordination of care \par Diagnosis and prognosis discussed\par Continue current medications (other than those changed below)\par Continue Flexeril 10 mg  q.d. at supper time (possible side effects explained)\par Start Nabumetone 500 mg b.i.d. end of breakfast and supper (Possible side effects explained including cardiovascular risk/MI/CVA) \par Prednisone 10 mg q.i.d. with food x3 days, t.i.d. x3 days, b.i.d. x3 days, 5 mg b.i.d. x3 days, 5 mg q.d. x3 days, then stop (Possible side effects explained including extensive discussion regarding risks including AVN requiring joint replacement) - patient declined \par Artificial tears one drop each eye q.i.d. and p.r.n.(Possible side effects explained)\par Biotene mouthwash/spray q.i.d. and p.r.n.(Possible side effects explained) \par Oral Hydration\par Patient declines oral medication for dryness\par Requested Ophthalmology consultation report be sent in\par Return visit 4 months\par Total time for this office visit, including face-to-face time and non-face-to-face time, 85 minutes--- including review of the chart and previous records, review of previous lab results with extensive discussion with the patient, ordering lab tests with coordination of care, review of recent imaging reports/x-ray results, ordering of new LS CAT scan with coordination of care, ordering of new x-rays with coordination of care, detailed medication history, review of medications going forward with their possible side effects, reviewed the impact of the patient's rheumatic disease on their other medical problems, reviewed the impact of the patient's other medical problems on their rheumatic disease

## 2022-10-29 NOTE — CONSULT LETTER
[Dear  ___] : Dear  [unfilled], [Consult Letter:] : I had the pleasure of evaluating your patient, [unfilled]. [Please see my note below.] : Please see my note below. [Consult Closing:] : Thank you very much for allowing me to participate in the care of this patient.  If you have any questions, please do not hesitate to contact me. [Sincerely,] : Sincerely, [FreeTextEntry3] : Popeye\par Myron I. Kleiner, M.D., FACR \par Chief, Division of Rheumatology\par Department of Medicine \par [facility]

## 2022-10-29 NOTE — HISTORY OF PRESENT ILLNESS
[FreeTextEntry1] : BARBI BLANCO is a 82 year old woman who presents for follow up office visit for further evaluation of joint symptoms and rheumatic diseases including osteoarthritis, Sjogren's syndrome, osteopenia and bilateral carpal tunnel syndrome, accompanied by her son-in-law Patrice.\par \par 1-2 months ago, patient developed pain right knee especially with weightbearing and after prolonged sitting- resolved. She then developed pain right groin - resolved.Now she has pain anterior right thigh especially when arising from supine position. She has persistent neck pain without radiation. Pain management  planning epidural steroid injection. Denies dry eyes and dry mouth. Did not start Flexeril or Nabumetone last visit secondary to pharmacy issues. The patient continues calcium and vitamin D supplements. Patient denies rash or side effects with current medications. Patient is content with current medication regimen. \par \par PMH:\par Retina specialist - diagnosed macular degeneration and treated with injections (? which)

## 2022-11-03 LAB
ALBUMIN MFR SERPL ELPH: 58.8 %
ALBUMIN SERPL-MCNC: 3.6 G/DL
ALBUMIN/GLOB SERPL: 1.4 RATIO
ALPHA1 GLOB MFR SERPL ELPH: 4.5 %
ALPHA1 GLOB SERPL ELPH-MCNC: 0.3 G/DL
ALPHA2 GLOB MFR SERPL ELPH: 12 %
ALPHA2 GLOB SERPL ELPH-MCNC: 0.7 G/DL
B-GLOBULIN MFR SERPL ELPH: 11.5 %
B-GLOBULIN SERPL ELPH-MCNC: 0.7 G/DL
DEPRECATED KAPPA LC FREE/LAMBDA SER: 1.43 RATIO
GAMMA GLOB FLD ELPH-MCNC: 0.8 G/DL
GAMMA GLOB MFR SERPL ELPH: 13.2 %
IGA SER QL IEP: 199 MG/DL
IGG SER QL IEP: 746 MG/DL
IGM SER QL IEP: 215 MG/DL
INTERPRETATION SERPL IEP-IMP: NORMAL
KAPPA LC CSF-MCNC: 2.5 MG/DL
KAPPA LC SERPL-MCNC: 3.57 MG/DL
M PROTEIN SPEC IFE-MCNC: NORMAL
PROT SERPL-MCNC: 6.1 G/DL
PROT SERPL-MCNC: 6.1 G/DL

## 2022-11-07 ENCOUNTER — NON-APPOINTMENT (OUTPATIENT)
Age: 82
End: 2022-11-07

## 2022-11-14 NOTE — ED ADULT TRIAGE NOTE - NSWEIGHTCALCTOOLDRUG_GEN_A_CORE
End Of Shift Note  [facility] CVICU  Summary of shift: Patient had 3Kg removed per HD RN and tolerated well. No issues today. Patient will be ready for  discharge tomorrow, per , [facility] will be covering for his patients.     Vitals:    Vitals:    11/14/22 1407 11/14/22 1423 11/14/22 1430 11/14/22 1600   BP: (!) 139/98      Pulse:       Resp:       Temp:    97.8 °F (36.6 °C)   TempSrc:    Oral   SpO2:  90% 94%    Weight:       Height:            I&O:   Intake/Output Summary (Last 24 hours) at 11/14/2022 1821  Last data filed at 11/14/2022 1431  Gross per 24 hour   Intake 620 ml   Output 4350 ml   Net -3730 ml       Resp Status: 2L/NC    Ventilator Settings:     / / /     Critical Care IV infusions:   sodium chloride      dextrose          LDA:   Peripheral IV 11/13/22 Left;Upper Forearm (Active)   Number of days: 1       Tunneled Hemodialysis Catheter Left Subclavian (Active)   Number of days: 197       Hemodialysis (linkable) Arteriovenous fistula Left Forearm (Active)   Number of days:        Hemodialysis (linkable) Arteriovenous fistula Left Forearm (Active)   Number of days:        Hemodialysis Fistula/Graft Arteriovenous fistula Right Arm (Active)   Number of days:        Incision 09/05/13 Arm Left (Active)   Number of days: 3357       Incision 10/29/13 Arm Left (Active)   Number of days: 3303       Incision 02/18/14 Leg Lower;Right (Active)   Number of days: 3191       Incision 09/18/22 Buttocks Right (Active)   Number of days: 56  used

## 2022-11-22 ENCOUNTER — OFFICE (OUTPATIENT)
Dept: URBAN - METROPOLITAN AREA CLINIC 115 | Facility: CLINIC | Age: 82
Setting detail: OPHTHALMOLOGY
End: 2022-11-22
Payer: MEDICARE

## 2022-11-22 DIAGNOSIS — H35.3211: ICD-10-CM

## 2022-11-22 DIAGNOSIS — H43.811: ICD-10-CM

## 2022-11-22 DIAGNOSIS — H35.031: ICD-10-CM

## 2022-11-22 PROCEDURE — 92134 CPTRZ OPH DX IMG PST SGM RTA: CPT | Performed by: OPHTHALMOLOGY

## 2022-11-22 PROCEDURE — 67028 INJECTION EYE DRUG: CPT | Performed by: OPHTHALMOLOGY

## 2022-11-22 ASSESSMENT — VISUAL ACUITY
OD_BCVA: PROSTHESIS
OS_BCVA: 20/40-1

## 2022-11-22 ASSESSMENT — CONFRONTATIONAL VISUAL FIELD TEST (CVF)
OD_FINDINGS: FULL
OS_FINDINGS: FULL

## 2022-11-22 ASSESSMENT — REFRACTION_AUTOREFRACTION
OD_CYLINDER: -2.25
OD_AXIS: 111
OS_SPHERE: UTP

## 2022-11-22 ASSESSMENT — KERATOMETRY
OD_AXISANGLE_DEGREES: 014
OS_K1POWER_DIOPTERS: UTP
OD_K1POWER_DIOPTERS: 45.25
METHOD_AUTO_MANUAL: AUTO
OD_K2POWER_DIOPTERS: 47.00

## 2022-11-22 ASSESSMENT — LID EXAM ASSESSMENTS
OD_BLEPHARITIS: 1+ 2+
OS_BLEPHARITIS: 3+

## 2022-11-22 ASSESSMENT — TONOMETRY: OD_IOP_MMHG: 18

## 2022-12-07 ENCOUNTER — OFFICE (OUTPATIENT)
Dept: URBAN - METROPOLITAN AREA CLINIC 115 | Facility: CLINIC | Age: 82
Setting detail: OPHTHALMOLOGY
End: 2022-12-07
Payer: MEDICARE

## 2022-12-07 DIAGNOSIS — S05.72XD: ICD-10-CM

## 2022-12-07 DIAGNOSIS — H01.014: ICD-10-CM

## 2022-12-07 DIAGNOSIS — H01.012: ICD-10-CM

## 2022-12-07 DIAGNOSIS — H01.015: ICD-10-CM

## 2022-12-07 DIAGNOSIS — H35.3211: ICD-10-CM

## 2022-12-07 DIAGNOSIS — H43.811: ICD-10-CM

## 2022-12-07 DIAGNOSIS — H01.011: ICD-10-CM

## 2022-12-07 DIAGNOSIS — H53.431: ICD-10-CM

## 2022-12-07 DIAGNOSIS — H35.031: ICD-10-CM

## 2022-12-07 PROCEDURE — 92133 CPTRZD OPH DX IMG PST SGM ON: CPT | Performed by: OPHTHALMOLOGY

## 2022-12-07 PROCEDURE — 99213 OFFICE O/P EST LOW 20 MIN: CPT | Performed by: OPHTHALMOLOGY

## 2022-12-07 PROCEDURE — 92083 EXTENDED VISUAL FIELD XM: CPT | Performed by: OPHTHALMOLOGY

## 2022-12-07 ASSESSMENT — SPHEQUIV_DERIVED: OD_SPHEQUIV: -0.625

## 2022-12-07 ASSESSMENT — KERATOMETRY
OD_K2POWER_DIOPTERS: 47.00
OS_K1POWER_DIOPTERS: UTP
OD_K1POWER_DIOPTERS: 45.25
OD_AXISANGLE_DEGREES: 014
METHOD_AUTO_MANUAL: AUTO

## 2022-12-07 ASSESSMENT — VISUAL ACUITY
OS_BCVA: 20/50+1
OD_BCVA: PROSTHESIS

## 2022-12-07 ASSESSMENT — REFRACTION_CURRENTRX
OS_VPRISM_DIRECTION: BF
OS_ADD: +2.75
OS_ADD: +2.75
OS_OVR_VA: 20/
OD_OVR_VA: 20/
OD_ADD: +3.00
OD_CYLINDER: -1.25
OS_OVR_VA: 20/
OD_OVR_VA: 20/
OS_CYLINDER: -1.25
OS_CYLINDER: -2.00
OD_SPHERE: PLANO
OD_SPHERE: +0.50
OS_CYLINDER: -2.00
OD_AXIS: 114
OD_OVR_VA: 20/
OS_VPRISM_DIRECTION: BF
OD_VPRISM_DIRECTION: BF
OS_OVR_VA: 20/
OS_AXIS: 113
OS_AXIS: 111
OD_ADD: +2.75
OD_SPHERE: +0.75
OD_VPRISM_DIRECTION: BF
OD_VPRISM_DIRECTION: BF
OD_AXIS: 090
OS_AXIS: 095
OD_CYLINDER: -2.00
OD_AXIS: 111
OD_CYLINDER: -1.75
OD_ADD: +2.50
OS_SPHERE: +0.75
OS_ADD: +3.00
OS_SPHERE: +0.50
OS_VPRISM_DIRECTION: BF
OS_SPHERE: PLANO

## 2022-12-07 ASSESSMENT — AXIALLENGTH_DERIVED: OD_AL: 22.8926

## 2022-12-07 ASSESSMENT — TONOMETRY: OD_IOP_MMHG: 21

## 2022-12-07 ASSESSMENT — REFRACTION_AUTOREFRACTION
OD_SPHERE: +0.25
OD_CYLINDER: -1.75
OS_SPHERE: UTP
OD_AXIS: 117

## 2022-12-07 ASSESSMENT — CONFRONTATIONAL VISUAL FIELD TEST (CVF)
OD_FINDINGS: FULL
OS_FINDINGS: FULL

## 2022-12-07 ASSESSMENT — LID EXAM ASSESSMENTS
OD_BLEPHARITIS: 1+ 2+
OS_BLEPHARITIS: 3+

## 2023-01-12 ENCOUNTER — OFFICE (OUTPATIENT)
Dept: URBAN - METROPOLITAN AREA CLINIC 115 | Facility: CLINIC | Age: 83
Setting detail: OPHTHALMOLOGY
End: 2023-01-12
Payer: MEDICARE

## 2023-01-12 DIAGNOSIS — H01.015: ICD-10-CM

## 2023-01-12 DIAGNOSIS — H35.031: ICD-10-CM

## 2023-01-12 DIAGNOSIS — H01.012: ICD-10-CM

## 2023-01-12 DIAGNOSIS — H01.011: ICD-10-CM

## 2023-01-12 DIAGNOSIS — H35.3211: ICD-10-CM

## 2023-01-12 DIAGNOSIS — H53.431: ICD-10-CM

## 2023-01-12 DIAGNOSIS — H43.811: ICD-10-CM

## 2023-01-12 DIAGNOSIS — S05.72XD: ICD-10-CM

## 2023-01-12 DIAGNOSIS — H01.014: ICD-10-CM

## 2023-01-12 PROCEDURE — 92012 INTRM OPH EXAM EST PATIENT: CPT | Performed by: OPHTHALMOLOGY

## 2023-01-12 PROCEDURE — 67028 INJECTION EYE DRUG: CPT | Performed by: OPHTHALMOLOGY

## 2023-01-12 PROCEDURE — 92235 FLUORESCEIN ANGRPH MLTIFRAME: CPT | Performed by: OPHTHALMOLOGY

## 2023-01-12 PROCEDURE — 92134 CPTRZ OPH DX IMG PST SGM RTA: CPT | Performed by: OPHTHALMOLOGY

## 2023-01-12 ASSESSMENT — LID EXAM ASSESSMENTS
OS_BLEPHARITIS: 3+
OD_BLEPHARITIS: 1+ 2+

## 2023-01-13 ASSESSMENT — KERATOMETRY
OS_K1POWER_DIOPTERS: UTP
OD_K1POWER_DIOPTERS: 45.25
OD_AXISANGLE_DEGREES: 014
OD_K2POWER_DIOPTERS: 47.00
METHOD_AUTO_MANUAL: AUTO

## 2023-01-13 ASSESSMENT — REFRACTION_AUTOREFRACTION
OS_SPHERE: UTP
OD_CYLINDER: -1.75
OD_SPHERE: +0.25
OD_AXIS: 117

## 2023-01-13 ASSESSMENT — SPHEQUIV_DERIVED: OD_SPHEQUIV: -0.625

## 2023-01-13 ASSESSMENT — VISUAL ACUITY
OS_BCVA: 20/60+2
OD_BCVA: PROSTHESIS

## 2023-01-13 ASSESSMENT — AXIALLENGTH_DERIVED: OD_AL: 22.8926

## 2023-02-07 ENCOUNTER — APPOINTMENT (OUTPATIENT)
Dept: RHEUMATOLOGY | Facility: CLINIC | Age: 83
End: 2023-02-07
Payer: MEDICARE

## 2023-02-07 VITALS
OXYGEN SATURATION: 97 % | HEART RATE: 65 BPM | RESPIRATION RATE: 17 BRPM | BODY MASS INDEX: 39.65 KG/M2 | HEIGHT: 61 IN | WEIGHT: 210 LBS | DIASTOLIC BLOOD PRESSURE: 70 MMHG | TEMPERATURE: 97.1 F | SYSTOLIC BLOOD PRESSURE: 120 MMHG

## 2023-02-07 DIAGNOSIS — M79.604 PAIN IN RIGHT LEG: ICD-10-CM

## 2023-02-07 DIAGNOSIS — M54.2 CERVICALGIA: ICD-10-CM

## 2023-02-07 DIAGNOSIS — M15.9 POLYOSTEOARTHRITIS, UNSPECIFIED: ICD-10-CM

## 2023-02-07 DIAGNOSIS — G89.29 CERVICALGIA: ICD-10-CM

## 2023-02-07 DIAGNOSIS — M21.951 UNSPECIFIED ACQUIRED DEFORMITY OF RIGHT THIGH: ICD-10-CM

## 2023-02-07 DIAGNOSIS — M54.17 RADICULOPATHY, LUMBOSACRAL REGION: ICD-10-CM

## 2023-02-07 DIAGNOSIS — R76.0 RAISED ANTIBODY TITER: ICD-10-CM

## 2023-02-07 DIAGNOSIS — M48.061 SPINAL STENOSIS, LUMBAR REGION WITHOUT NEUROGENIC CLAUDICATION: ICD-10-CM

## 2023-02-07 DIAGNOSIS — G56.03 CARPAL TUNNEL SYNDROM,BILATERAL UPPER LIMBS: ICD-10-CM

## 2023-02-07 DIAGNOSIS — M85.80 OTHER SPECIFIED DISORDERS OF BONE DENSITY AND STRUCTURE, UNSPECIFIED SITE: ICD-10-CM

## 2023-02-07 DIAGNOSIS — M21.952 UNSPECIFIED ACQUIRED DEFORMITY OF RIGHT THIGH: ICD-10-CM

## 2023-02-07 DIAGNOSIS — M35.00 SICCA SYNDROME, UNSPECIFIED: ICD-10-CM

## 2023-02-07 PROCEDURE — 36415 COLL VENOUS BLD VENIPUNCTURE: CPT

## 2023-02-07 PROCEDURE — 99215 OFFICE O/P EST HI 40 MIN: CPT | Mod: 25

## 2023-02-07 PROCEDURE — G2212 PROLONG OUTPT/OFFICE VIS: CPT

## 2023-02-11 PROBLEM — M54.17 LUMBOSACRAL RADICULOPATHY: Status: ACTIVE | Noted: 2023-02-11

## 2023-02-11 PROBLEM — M79.604 PAIN OF RIGHT ANTERIOR LOWER EXTREMITY: Status: ACTIVE | Noted: 2022-10-24

## 2023-02-11 LAB
ALBUMIN SERPL ELPH-MCNC: 4 G/DL
ALP BLD-CCNC: 101 U/L
ALT SERPL-CCNC: 34 U/L
ANION GAP SERPL CALC-SCNC: 12 MMOL/L
AST SERPL-CCNC: 25 U/L
BASOPHILS # BLD AUTO: 0.05 K/UL
BASOPHILS NFR BLD AUTO: 0.6 %
BILIRUB SERPL-MCNC: 0.3 MG/DL
BUN SERPL-MCNC: 65 MG/DL
CALCIUM SERPL-MCNC: 9.5 MG/DL
CHLORIDE SERPL-SCNC: 105 MMOL/L
CK SERPL-CCNC: 54 U/L
CO2 SERPL-SCNC: 26 MMOL/L
CREAT SERPL-MCNC: 1.48 MG/DL
CRP SERPL-MCNC: <3 MG/L
EGFR: 35 ML/MIN/1.73M2
ENA SS-A AB SER IA-ACNC: <0.2 AL
ENA SS-B AB SER IA-ACNC: <0.2 AL
EOSINOPHIL # BLD AUTO: 0.09 K/UL
EOSINOPHIL NFR BLD AUTO: 1.1 %
ERYTHROCYTE [SEDIMENTATION RATE] IN BLOOD BY WESTERGREN METHOD: 15 MM/HR
GLUCOSE SERPL-MCNC: 81 MG/DL
HCT VFR BLD CALC: 43.4 %
HGB BLD-MCNC: 13.9 G/DL
IMM GRANULOCYTES NFR BLD AUTO: 1.2 %
LDH SERPL-CCNC: 196 U/L
LYMPHOCYTES # BLD AUTO: 1.54 K/UL
LYMPHOCYTES NFR BLD AUTO: 18.3 %
MAN DIFF?: NORMAL
MCHC RBC-ENTMCNC: 32 GM/DL
MCHC RBC-ENTMCNC: 32.3 PG
MCV RBC AUTO: 100.9 FL
MONOCYTES # BLD AUTO: 0.67 K/UL
MONOCYTES NFR BLD AUTO: 8 %
NEUTROPHILS # BLD AUTO: 5.96 K/UL
NEUTROPHILS NFR BLD AUTO: 70.8 %
PHOSPHATE SERPL-MCNC: 4 MG/DL
PLATELET # BLD AUTO: 242 K/UL
POTASSIUM SERPL-SCNC: 4.9 MMOL/L
PROT SERPL-MCNC: 6.4 G/DL
RBC # BLD: 4.3 M/UL
RBC # FLD: 14 %
RHEUMATOID FACT SER QL: <10 IU/ML
SODIUM SERPL-SCNC: 144 MMOL/L
WBC # FLD AUTO: 8.41 K/UL

## 2023-02-11 RX ORDER — OXAPROZIN 600 MG/1
600 TABLET ORAL
Qty: 150 | Refills: 0 | Status: ACTIVE | COMMUNITY
Start: 2023-02-11 | End: 1900-01-01

## 2023-02-11 RX ORDER — NABUMETONE 500 MG/1
500 TABLET, FILM COATED ORAL
Qty: 180 | Refills: 0 | Status: DISCONTINUED | COMMUNITY
Start: 2022-02-28 | End: 2023-02-11

## 2023-02-11 RX ORDER — CYCLOBENZAPRINE HYDROCHLORIDE 10 MG/1
10 TABLET, FILM COATED ORAL
Qty: 90 | Refills: 0 | Status: DISCONTINUED | COMMUNITY
Start: 2022-02-28 | End: 2023-02-11

## 2023-02-11 RX ORDER — TIZANIDINE 2 MG/1
2 TABLET ORAL
Qty: 180 | Refills: 0 | Status: ACTIVE | COMMUNITY
Start: 2023-02-11 | End: 1900-01-01

## 2023-02-11 NOTE — CONSULT LETTER
[Dear  ___] : Dear  [unfilled], [Consult Letter:] : I had the pleasure of evaluating your patient, [unfilled]. [Please see my note below.] : Please see my note below. [Consult Closing:] : Thank you very much for allowing me to participate in the care of this patient.  If you have any questions, please do not hesitate to contact me. [Sincerely,] : Sincerely, [FreeTextEntry3] : Popeye\par Myron I. Kleiner, M.D., FACR \par Chief, Division of Rheumatology\par Department of Medicine \par [facility]  [DrDominick  ___] : Dr. VALDEZ

## 2023-02-11 NOTE — ADDENDUM
[FreeTextEntry1] : I, Costa Pineda, acted solely as a scribe for Dr. Myron I. Kleiner, MD. on 02/07/2023 .

## 2023-02-11 NOTE — PHYSICAL EXAM
[General Appearance - Alert] : alert [General Appearance - In No Acute Distress] : in no acute distress [General Appearance - Well Nourished] : well nourished [General Appearance - Well Developed] : well developed [General Appearance - Well-Appearing] : healthy appearing [PERRL With Normal Accommodation] : pupils were equal in size, round, and reactive to light [Extraocular Movements] : extraocular movements were intact [Strabismus] : no strabismus was seen [Optic Disc Abnormality] : the optic disc were normal in size and color [Retina] : no retinal hemorrhages, vessel changes or exudates seen on fundoscopic exam [Outer Ear] : the ears and nose were normal in appearance [Oropharynx] : the oropharynx was normal [Neck Appearance] : the appearance of the neck was normal [Neck Cervical Mass (___cm)] : no neck mass was observed [Jugular Venous Distention Increased] : there was no jugular-venous distention [Thyroid Diffuse Enlargement] : the thyroid was not enlarged [Thyroid Nodule] : there were no palpable thyroid nodules [Lungs Percussion] : the lungs were normal to percussion [Heart Rate And Rhythm] : heart rate was normal and rhythm regular [Edema] : there was no peripheral edema [Abdomen Soft] : soft [Abdomen Tenderness] : non-tender [Abdomen Mass (___ Cm)] : no abdominal mass palpated [Cervical Lymph Nodes Enlarged Posterior Bilaterally] : posterior cervical [Cervical Lymph Nodes Enlarged Anterior Bilaterally] : anterior cervical [Supraclavicular Lymph Nodes Enlarged Bilaterally] : supraclavicular [Axillary Lymph Nodes Enlarged Bilaterally] : axillary [No CVA Tenderness] : no ~M costovertebral angle tenderness [No Spinal Tenderness] : no spinal tenderness [Abnormal Walk] : normal gait [Nail Clubbing] : no clubbing  or cyanosis of the fingernails [Musculoskeletal - Swelling] : no joint swelling seen [Motor Tone] : muscle strength and tone were normal [Skin Color & Pigmentation] : normal skin color and pigmentation [Skin Turgor] : normal skin turgor [] : no rash [Cranial Nerves] : cranial nerves 2-12 were intact [Deep Tendon Reflexes (DTR)] : deep tendon reflexes were 2+ and symmetric [Sensation] : the sensory exam was normal to light touch and pinprick [Motor Exam] : the motor exam was normal [No Focal Deficits] : no focal deficits [Oriented To Time, Place, And Person] : oriented to person, place, and time [Impaired Insight] : insight and judgment were intact [Affect] : the affect was normal [Mood] : the mood was normal [FreeTextEntry1] : Strength-5/5

## 2023-02-11 NOTE — HISTORY OF PRESENT ILLNESS
[FreeTextEntry1] : BARBI BLANCO is a 82 year old woman who presents for follow up office visit for further evaluation of joint symptoms and rheumatic diseases including osteoarthritis, Sjogren's syndrome, osteopenia and bilateral carpal tunnel syndrome, accompanied by her son-in-law Patrice.\par \par Patient continues with several month history pain anterior right thigh, especially after prolonged sitting. 4 days ago, pain management Dr. Eric gave lumbar epidural steroid injection with no relief. Upon further questioning, her son-in-law reports Dr. Eric will not give patient more injections unless she loses weight. She has persistent neck pain without radiation. Flexeril 10 mg q.d. and Nabumetone 500 mg b.i.d. with little relief. The patient continues calcium 500 mg q.d. "when I remember" and vitamin D supplements 3,000 IU q.d. Patient denies rash or side effects with current medications. Patient is content with current medication regimen. \par \par PMH:\par Retina specialist - diagnosed macular degeneration and treated with injections (? which)

## 2023-02-11 NOTE — ASSESSMENT
[FreeTextEntry1] : Impression: BARBI BLANCO is a 82 year old woman who presents for follow up office visit for further evaluation of joint symptoms and rheumatic diseases including osteoarthritis, lumbar spinal stenosis, Sjogren's syndrome, osteopenia and bilateral carpal tunnel syndrome, accompanied by her son-in-law Patrice.\par \par Patient continues with several month history pain anterior right thigh, especially after prolonged sitting, secondary to LS radiculopathy secondary to her osteoarthritis and lumbar spinal stenosis and foraminal stenosis.. 4 days ago, pain management Dr. Eric gave lumbar epidural steroid injection with no relief. Upon further questioning, her son-in-law reports Dr. Eric will not give patient more injections unless she loses weight. Recent CAT scan of LS spine results reveal severe advanced osteoarthritis with foraminal and central spinal stenosis, no other significant changes or results, with extensive discussion. She has persistent neck pain without radiation secondary to her osteoarthritis and chronic neck pain. On exam, she has dry eyes consistent with her Sjogren's syndrome - will continue to monitor. Recent X-ray results reveal right total hip and knee replacements without loosening, advanced osteoarthritis cervical spine; no other significant changes or results, with extensive discussion. Flexeril 10 mg q.d. and Nabumetone 500 mg b.i.d. with little relief. Recent laboratory test results reveal no significant changes or results, with extensive discussion. The patient continues calcium 500 mg q.d. "when I remember" and vitamin D supplements 3,000 IU q.d for her osteopenia. Patient denies rash or side effects with current medications. Patient is content with current medication regimen. \par \par Plan: I reviewed her chart and previous records\par  I reviewed recent x-ray results with the patient and son-in-law with extensive discussion \par I reviewed recent CAT scan LS spine results with the patient and son-in-law with extensive discussion\par I reviewed recent lab results with patient and son-in-law with extensive discussion\par Laboratory tests ordered - see list below - with coordination of care \par Diagnosis and prognosis discussed\par Continue current medications (other than those changed below)\par Discontinue Flexeril\par Discontinue Nabumetone\par Oxaprozin 600 mg  1 tab end of breakfast and 1/2 tab after supper (Possible side effects explained including cardiovascular risk/MI/CVA)  \par Tizanidine 2 mg b.i.d. morning and h.s. (Possible side effects explained) \par Prednisone 10 mg q.i.d. with food x3 days, t.i.d. x3 days, b.i.d. x3 days, 5 mg b.i.d. x3 days, 5 mg q.d. x2 days, then stop (Possible side effects explained including extensive discussion regarding risks including AVN requiring joint replacement)-- patient declined\par Artificial tears one drop each eye q.i.d. and p.r.n.(Possible side effects explained)\par Biotene mouthwash/spray q.i.d. and p.r.n.(Possible side effects explained) \par Oral Hydration\par Patient declines oral medication for dryness\par Consult PCP or Urology regarding polyuria\par Continue following with pain management--with extensive discussion\par Return visit 4 months\par Total time for this office visit, including face-to-face time and non-face-to-face time, 70 minutes--- including review of the chart and previous records, review of previous lab results with extensive discussion with the patient and her son-in-law, ordering lab tests with coordination of care, review of recent imaging reports/x-ray results with extensive discussion with the patient and her son-in-law, review of the results of the recent LS CAT scan with extensive discussion with the patient and her son-in-law,  detailed medication history, review of medications going forward with their possible side effects, extensive discussion regarding the need to follow-up with pain management\par

## 2023-03-17 ENCOUNTER — EMERGENCY (EMERGENCY)
Facility: HOSPITAL | Age: 83
LOS: 1 days | Discharge: HOME | End: 2023-03-17
Attending: EMERGENCY MEDICINE
Payer: MEDICARE

## 2023-03-17 VITALS
WEIGHT: 169.98 LBS | TEMPERATURE: 98 F | DIASTOLIC BLOOD PRESSURE: 68 MMHG | OXYGEN SATURATION: 100 % | SYSTOLIC BLOOD PRESSURE: 156 MMHG | HEIGHT: 65 IN | RESPIRATION RATE: 16 BRPM | HEART RATE: 50 BPM

## 2023-03-17 LAB
ALBUMIN SERPL ELPH-MCNC: 3.7 G/DL — SIGNIFICANT CHANGE UP (ref 3.3–5.2)
ALP SERPL-CCNC: 90 U/L — SIGNIFICANT CHANGE UP (ref 40–120)
ALT FLD-CCNC: 44 U/L — HIGH
ANION GAP SERPL CALC-SCNC: 12 MMOL/L — SIGNIFICANT CHANGE UP (ref 5–17)
AST SERPL-CCNC: 40 U/L — HIGH
BASOPHILS # BLD AUTO: 0.03 K/UL — SIGNIFICANT CHANGE UP (ref 0–0.2)
BASOPHILS NFR BLD AUTO: 0.7 % — SIGNIFICANT CHANGE UP (ref 0–2)
BILIRUB SERPL-MCNC: 0.2 MG/DL — LOW (ref 0.4–2)
BUN SERPL-MCNC: 54.7 MG/DL — HIGH (ref 8–20)
CALCIUM SERPL-MCNC: 9.4 MG/DL — SIGNIFICANT CHANGE UP (ref 8.4–10.5)
CHLORIDE SERPL-SCNC: 110 MMOL/L — HIGH (ref 96–108)
CO2 SERPL-SCNC: 26 MMOL/L — SIGNIFICANT CHANGE UP (ref 22–29)
CREAT SERPL-MCNC: 1.27 MG/DL — SIGNIFICANT CHANGE UP (ref 0.5–1.3)
EGFR: 42 ML/MIN/1.73M2 — LOW
EOSINOPHIL # BLD AUTO: 0.11 K/UL — SIGNIFICANT CHANGE UP (ref 0–0.5)
EOSINOPHIL NFR BLD AUTO: 2.6 % — SIGNIFICANT CHANGE UP (ref 0–6)
FLUAV AG NPH QL: SIGNIFICANT CHANGE UP
FLUBV AG NPH QL: SIGNIFICANT CHANGE UP
GLUCOSE SERPL-MCNC: 74 MG/DL — SIGNIFICANT CHANGE UP (ref 70–99)
HCT VFR BLD CALC: 37.8 % — SIGNIFICANT CHANGE UP (ref 34.5–45)
HGB BLD-MCNC: 12.3 G/DL — SIGNIFICANT CHANGE UP (ref 11.5–15.5)
IMM GRANULOCYTES NFR BLD AUTO: 0.7 % — SIGNIFICANT CHANGE UP (ref 0–0.9)
LYMPHOCYTES # BLD AUTO: 0.69 K/UL — LOW (ref 1–3.3)
LYMPHOCYTES # BLD AUTO: 16.5 % — SIGNIFICANT CHANGE UP (ref 13–44)
MCHC RBC-ENTMCNC: 32.3 PG — SIGNIFICANT CHANGE UP (ref 27–34)
MCHC RBC-ENTMCNC: 32.5 GM/DL — SIGNIFICANT CHANGE UP (ref 32–36)
MCV RBC AUTO: 99.2 FL — SIGNIFICANT CHANGE UP (ref 80–100)
MONOCYTES # BLD AUTO: 0.21 K/UL — SIGNIFICANT CHANGE UP (ref 0–0.9)
MONOCYTES NFR BLD AUTO: 5 % — SIGNIFICANT CHANGE UP (ref 2–14)
NEUTROPHILS # BLD AUTO: 3.1 K/UL — SIGNIFICANT CHANGE UP (ref 1.8–7.4)
NEUTROPHILS NFR BLD AUTO: 74.5 % — SIGNIFICANT CHANGE UP (ref 43–77)
PLATELET # BLD AUTO: 172 K/UL — SIGNIFICANT CHANGE UP (ref 150–400)
POTASSIUM SERPL-MCNC: 4.8 MMOL/L — SIGNIFICANT CHANGE UP (ref 3.5–5.3)
POTASSIUM SERPL-SCNC: 4.8 MMOL/L — SIGNIFICANT CHANGE UP (ref 3.5–5.3)
PROT SERPL-MCNC: 6.5 G/DL — LOW (ref 6.6–8.7)
RBC # BLD: 3.81 M/UL — SIGNIFICANT CHANGE UP (ref 3.8–5.2)
RBC # FLD: 15.4 % — HIGH (ref 10.3–14.5)
RSV RNA NPH QL NAA+NON-PROBE: SIGNIFICANT CHANGE UP
SARS-COV-2 RNA SPEC QL NAA+PROBE: SIGNIFICANT CHANGE UP
SODIUM SERPL-SCNC: 148 MMOL/L — HIGH (ref 135–145)
TROPONIN T SERPL-MCNC: <0.01 NG/ML — SIGNIFICANT CHANGE UP (ref 0–0.06)
WBC # BLD: 4.17 K/UL — SIGNIFICANT CHANGE UP (ref 3.8–10.5)
WBC # FLD AUTO: 4.17 K/UL — SIGNIFICANT CHANGE UP (ref 3.8–10.5)

## 2023-03-17 PROCEDURE — 71045 X-RAY EXAM CHEST 1 VIEW: CPT | Mod: 26

## 2023-03-17 PROCEDURE — G1004: CPT

## 2023-03-17 PROCEDURE — 72131 CT LUMBAR SPINE W/O DYE: CPT | Mod: 26,ME

## 2023-03-17 PROCEDURE — 70450 CT HEAD/BRAIN W/O DYE: CPT | Mod: 26,MB

## 2023-03-17 PROCEDURE — 99285 EMERGENCY DEPT VISIT HI MDM: CPT

## 2023-03-17 RX ORDER — SODIUM CHLORIDE 9 MG/ML
1000 INJECTION INTRAMUSCULAR; INTRAVENOUS; SUBCUTANEOUS ONCE
Refills: 0 | Status: COMPLETED | OUTPATIENT
Start: 2023-03-17 | End: 2023-03-17

## 2023-03-17 RX ORDER — MORPHINE SULFATE 50 MG/1
4 CAPSULE, EXTENDED RELEASE ORAL ONCE
Refills: 0 | Status: DISCONTINUED | OUTPATIENT
Start: 2023-03-17 | End: 2023-03-17

## 2023-03-17 RX ADMIN — MORPHINE SULFATE 4 MILLIGRAM(S): 50 CAPSULE, EXTENDED RELEASE ORAL at 19:10

## 2023-03-17 RX ADMIN — SODIUM CHLORIDE 1000 MILLILITER(S): 9 INJECTION INTRAMUSCULAR; INTRAVENOUS; SUBCUTANEOUS at 18:58

## 2023-03-17 NOTE — ED ADULT TRIAGE NOTE - AS TEMP SITE
oral
How Severe Is Your Skin Lesion?: moderate
Has Your Skin Lesion Been Treated?: not been treated
Is This A New Presentation, Or A Follow-Up?: Skin Lesion
Which Family Member (Optional)?: Father

## 2023-03-17 NOTE — ED PROVIDER NOTE - CLINICAL SUMMARY MEDICAL DECISION MAKING FREE TEXT BOX
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 Son-in-law report that patient has progressive trouble walking with pain to the right leg. It's on going for over 1 year. She lives at home by herself and normally can ambulate with walker but unable to get up today. Patient able to move b/l legs. will get CT head, CT lumbar. blood work to assess for dehydration. IVF. patient unable to get MRI due to IVC filter. will need PT eval.

## 2023-03-17 NOTE — ED PROVIDER NOTE - CONSIDERATION OF ADMISSION OBSERVATION
Consideration of Admission/Observation pt cannot care for herself at home danger to self at home cannot perform her ADLS we will opbs for PT SW and chidi for rehab placement

## 2023-03-17 NOTE — ED ADULT TRIAGE NOTE - CHIEF COMPLAINT QUOTE
pt BIBA c/o worsening chronic lower back pain making it difficult to walk x 3 days. pt states she received steroid shot yesterday with no relief. she is unable to walk to bathroom or kitchen due to pain. sensation intact, no numbness/tingling, urinary sx

## 2023-03-17 NOTE — ED PROVIDER NOTE - OBJECTIVE STATEMENT
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 Son-in-law report that patient has progressive trouble walking with pain to the right leg. It's on going for over 1 year. She lives at home by herself and normally can ambulate with walker but unable to get up today. She had recent blood work done by PMD that showed dehydration and worsening renal function. She was scheduled for colonoscopy pending cardiac clearance. She was found to have bradycardia, RBBB, she had echo and stress done but doesn't know the result yet. no chest pain. no sob. occasional cough. patient report right leg pain.     cards: [facility]ulks heart

## 2023-03-17 NOTE — ED PROVIDER NOTE - PHYSICAL EXAMINATION
VITAL SIGNS: I have reviewed nursing notes and confirm.  CONSTITUTIONAL:  in no acute distress.  SKIN: Skin exam is warm and dry, no acute rash.  HEAD: Normocephalic; atraumatic.  EYES: PERRL, EOM intact; conjunctiva and sclera clear. (+) left eye removed   ENT: No nasal discharge; airway clear. Throat clear.  NECK: Supple; non tender.    CARD: Regular rate and rhythm.  RESP: No wheezes,  no rales or rhonchi.   ABD:  soft; non-distended; non-tender;   EXT: (+) mild right leg pain. no back pain.   NEURO: Alert, oriented. Grossly unremarkable. No focal deficits.  moves all extremities,  PSYCH: Cooperative, appropriate.

## 2023-03-17 NOTE — ED PROVIDER NOTE - NS ED ROS FT
Review of Systems  •	CONSTITUTIONAL - no  fever, no diaphoresis, no weight change  •	SKIN - no rash  •	HEMATOLOGIC - no bleeding, no bruising  •	EYES - no eye pain, no blurred vision  •	ENT - no change in hearing, no pain  •	RESPIRATORY - no shortness of breath, no cough  •	CARDIAC - no chest pain, no palpitations  •	GI - no abd pain, no nausea, no vomiting, no diarrhea, no constipation, no bleeding  •	GENITO-URINARY - no discharge, no dysuria; no hematuria,   •	ENDO - no polydipsia, no polyuria, no heat/no cold intolerance  •	MUSCULOSKELETAL - no joint pain, no swelling, no redness  •	NEUROLOGIC - (+) b/l leg  weakness, no headache, no anesthesia, no paresthesias  •	PSYCH - no anxiety, non suicidal, non homicidal, no hallucination, no depression

## 2023-03-18 LAB
APPEARANCE UR: ABNORMAL
BACTERIA # UR AUTO: ABNORMAL
BILIRUB UR-MCNC: NEGATIVE — SIGNIFICANT CHANGE UP
COLOR SPEC: YELLOW — SIGNIFICANT CHANGE UP
DIFF PNL FLD: ABNORMAL
EPI CELLS # UR: SIGNIFICANT CHANGE UP
GLUCOSE UR QL: NEGATIVE MG/DL — SIGNIFICANT CHANGE UP
KETONES UR-MCNC: NEGATIVE — SIGNIFICANT CHANGE UP
LEUKOCYTE ESTERASE UR-ACNC: ABNORMAL
NITRITE UR-MCNC: NEGATIVE — SIGNIFICANT CHANGE UP
PH UR: 5 — SIGNIFICANT CHANGE UP (ref 5–8)
PROT UR-MCNC: 15 MG/DL
RBC CASTS # UR COMP ASSIST: ABNORMAL /HPF (ref 0–4)
SP GR SPEC: 1.02 — SIGNIFICANT CHANGE UP (ref 1.01–1.02)
UROBILINOGEN FLD QL: NEGATIVE MG/DL — SIGNIFICANT CHANGE UP
WBC UR QL: ABNORMAL /HPF (ref 0–5)

## 2023-03-18 PROCEDURE — 93010 ELECTROCARDIOGRAM REPORT: CPT

## 2023-03-18 PROCEDURE — 99236 HOSP IP/OBS SAME DATE HI 85: CPT

## 2023-03-18 RX ORDER — TELMISARTAN 20 MG/1
0 TABLET ORAL
Qty: 0 | Refills: 0 | DISCHARGE

## 2023-03-18 RX ORDER — OXAPROZIN 600 MG/1
2 TABLET, FILM COATED ORAL
Qty: 0 | Refills: 0 | DISCHARGE

## 2023-03-18 RX ORDER — NITROFURANTOIN MACROCRYSTAL 50 MG
100 CAPSULE ORAL
Refills: 0 | Status: DISCONTINUED | OUTPATIENT
Start: 2023-03-18 | End: 2023-03-26

## 2023-03-18 RX ORDER — ACETAMINOPHEN 500 MG
650 TABLET ORAL EVERY 6 HOURS
Refills: 0 | Status: DISCONTINUED | OUTPATIENT
Start: 2023-03-18 | End: 2023-03-26

## 2023-03-18 RX ORDER — PANTOPRAZOLE SODIUM 20 MG/1
40 TABLET, DELAYED RELEASE ORAL
Refills: 0 | Status: DISCONTINUED | OUTPATIENT
Start: 2023-03-18 | End: 2023-03-26

## 2023-03-18 RX ORDER — BACLOFEN 100 %
0 POWDER (GRAM) MISCELLANEOUS
Qty: 0 | Refills: 0 | DISCHARGE

## 2023-03-18 RX ORDER — DOCUSATE SODIUM 100 MG
0 CAPSULE ORAL
Qty: 0 | Refills: 0 | DISCHARGE

## 2023-03-18 RX ORDER — METHOCARBAMOL 500 MG/1
500 TABLET, FILM COATED ORAL EVERY 8 HOURS
Refills: 0 | Status: DISCONTINUED | OUTPATIENT
Start: 2023-03-18 | End: 2023-03-26

## 2023-03-18 RX ORDER — TIZANIDINE 4 MG/1
2 TABLET ORAL
Qty: 0 | Refills: 0 | DISCHARGE

## 2023-03-18 RX ORDER — OMEPRAZOLE 10 MG/1
1 CAPSULE, DELAYED RELEASE ORAL
Qty: 0 | Refills: 0 | DISCHARGE

## 2023-03-18 RX ORDER — AMLODIPINE BESYLATE 2.5 MG/1
5 TABLET ORAL DAILY
Refills: 0 | Status: DISCONTINUED | OUTPATIENT
Start: 2023-03-18 | End: 2023-03-26

## 2023-03-18 RX ORDER — ASPIRIN/CALCIUM CARB/MAGNESIUM 324 MG
81 TABLET ORAL DAILY
Refills: 0 | Status: DISCONTINUED | OUTPATIENT
Start: 2023-03-18 | End: 2023-03-26

## 2023-03-18 RX ORDER — LOPERAMIDE HCL 2 MG
2 TABLET ORAL ONCE
Refills: 0 | Status: COMPLETED | OUTPATIENT
Start: 2023-03-18 | End: 2023-03-18

## 2023-03-18 RX ADMIN — PANTOPRAZOLE SODIUM 40 MILLIGRAM(S): 20 TABLET, DELAYED RELEASE ORAL at 05:18

## 2023-03-18 RX ADMIN — Medication 81 MILLIGRAM(S): at 11:20

## 2023-03-18 RX ADMIN — AMLODIPINE BESYLATE 5 MILLIGRAM(S): 2.5 TABLET ORAL at 05:18

## 2023-03-18 RX ADMIN — METHOCARBAMOL 500 MILLIGRAM(S): 500 TABLET, FILM COATED ORAL at 05:18

## 2023-03-18 RX ADMIN — Medication 650 MILLIGRAM(S): at 10:14

## 2023-03-18 NOTE — PROVIDER CONTACT NOTE (OTHER) - ASSESSMENT
pt ok for PT per nursecoleen. pt awake on stretcher on room air, +purewick. AAOx4. knee pain 3/10 rest, 3/10 after PT. agreeable to eval. pt required assist for all mobility at this time. needs to be independent for d/c home. pt returned to stretcher. left in nad w/all needs, all lines intact. will follow. handoff to nurse and PA.

## 2023-03-18 NOTE — ED CDU PROVIDER INITIAL DAY NOTE - CONSIDERATION OF ADMISSION OBSERVATION
pt cannot care for herself at home danger to self at home cannot perform her ADLS we will opbs for PT SW and chidi for rehab placement Consideration of Admission/Observation

## 2023-03-18 NOTE — PHYSICAL THERAPY INITIAL EVALUATION ADULT - PERTINENT HX OF CURRENT PROBLEM, REHAB EVAL
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 Son-in-law report that patient has progressive trouble walking with pain to the right leg. It's on going for over 1 year. She lives at home by herself and normally can ambulate using 2 canes but today she felt like she couldn't walk - clarifies not necessarily due to pain level but felt weak. She had recent blood work done by PMD that showed dehydration and worsening renal function. She was scheduled for colonoscopy pending cardiac clearance - just had nuclear stress test yesterday awaiting results. She was found to have bradycardia, RBBB, she had echo and stress done but doesn't know the result yet. no chest pain. no sob. occasional cough. patient report right leg pain to anterior thigh, chronic in nature. no new falls/injuries. recently had epidural injection 3-4 weeks ago

## 2023-03-18 NOTE — PHYSICAL THERAPY INITIAL EVALUATION ADULT - ADDITIONAL COMMENTS
pt states she lives alone in a 2 story house with 3 steps to enter (no rail) and stays on first floor. uses two canes to walk with. has commode by her bed. sponge bathes.

## 2023-03-18 NOTE — PROVIDER CONTACT NOTE (OTHER) - BACKGROUND
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

## 2023-03-18 NOTE — PHYSICAL THERAPY INITIAL EVALUATION ADULT - MANUAL MUSCLE TESTING RESULTS, REHAB EVAL
right shoulder flex 4/5, elbow flex 4/5; left shoulder flex 3-/5, elbow flex 4/5; bilateral hip flex 3-/5, knee ext 3+/5, ankle df 4/5

## 2023-03-18 NOTE — PHYSICAL THERAPY INITIAL EVALUATION ADULT - PASSIVE RANGE OF MOTION EXAMINATION, REHAB EVAL
left shoulder flex to ~60 degrees, elbow WFL; knees "stiff" bilaterally/Right UE Passive ROM was WFL (within functional limits)/bilateral lower extremity Passive ROM was WFL (within functional limits)

## 2023-03-18 NOTE — ED CDU PROVIDER INITIAL DAY NOTE - PHYSICAL EXAMINATION
VITAL SIGNS: I have reviewed nursing notes and confirm.  CONSTITUTIONAL:  in no acute distress.  SKIN: Skin exam is warm and dry, no acute rash.  HEAD: Normocephalic; atraumatic.  EYES: PERRL, EOM intact; conjunctiva and sclera clear. (+) left eye removed   ENT: No nasal discharge; airway clear. Throat clear.  NECK: Supple; non tender.    CARD: Regular rate and rhythm.  RESP: No wheezes,  no rales or rhonchi.   ABD:  soft; non-distended; non-tender;   EXT: (+) mild right leg pain. no back pain or tenderness. full ROM BLE no erythema , pulses intact  NEURO: Alert, oriented. Grossly unremarkable2_. No focal deficits.  moves all extremities,  PSYCH: Cooperative, appropriate.

## 2023-03-18 NOTE — ED CDU PROVIDER INITIAL DAY NOTE - CLINICAL SUMMARY MEDICAL DECISION MAKING FREE TEXT BOX
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 Son-in-law report that patient has progressive trouble walking with pain to the right leg. It's on going for over 1 year. Had outpt CTs, gets epidural injections most recently a few weeks ago. She lives at home by herself and normally can ambulate using 2 canes but unable to get up today. Patient able to move b/l legs, no back pain or tenderness. Labs and CT unremarkable, notes chronic degenerative changes in spine, pain improved. Pt to be placed in observation for PT eval and social work

## 2023-03-18 NOTE — ED CDU PROVIDER INITIAL DAY NOTE - OBJECTIVE STATEMENT
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 Son-in-law report that patient has progressive trouble walking with pain to the right leg. It's on going for over 1 year. She lives at home by herself and normally can ambulate using 2 canes but today she felt like she couldn't walk - clarifies not necessarily due to pain level but felt weak. She had recent blood work done by PMD that showed dehydration and worsening renal function. She was scheduled for colonoscopy pending cardiac clearance - just had nuclear stress test yesterday awaiting results. She was found to have bradycardia, RBBB, she had echo and stress done but doesn't know the result yet. no chest pain. no sob. occasional cough. patient report right leg pain to anterior thigh, chronic in nature. no new falls/injuries. recently had epidural injection 3-4 weeks ago    cards: [facility]ulks heart

## 2023-03-18 NOTE — CHART NOTE - NSCHARTNOTEFT_GEN_A_CORE
NERI NOTE: PT recs [facility] for dispo plan. Pt met at bedside by SW and CCC, confirming pt in agreement to attend [facility]. Pt unsure where she would like to go. ZONIA completed and circulated throughout [facility] for review. NERI to continue following for d/c planning to [facility] [place].

## 2023-03-18 NOTE — PROVIDER CONTACT NOTE (OTHER) - ACTION/TREATMENT ORDERED:
5 day goals: 1. independent bed mobility, 2. supervision transfers with RW, 3. supervision amb 25' with RW. plan: strengthening, mobility training.

## 2023-03-19 VITALS
RESPIRATION RATE: 18 BRPM | TEMPERATURE: 98 F | DIASTOLIC BLOOD PRESSURE: 63 MMHG | SYSTOLIC BLOOD PRESSURE: 157 MMHG | HEART RATE: 61 BPM | OXYGEN SATURATION: 93 %

## 2023-03-19 PROCEDURE — 96374 THER/PROPH/DIAG INJ IV PUSH: CPT

## 2023-03-19 PROCEDURE — G1004: CPT

## 2023-03-19 PROCEDURE — 85025 COMPLETE CBC W/AUTO DIFF WBC: CPT

## 2023-03-19 PROCEDURE — G0378: CPT

## 2023-03-19 PROCEDURE — 81001 URINALYSIS AUTO W/SCOPE: CPT

## 2023-03-19 PROCEDURE — 87086 URINE CULTURE/COLONY COUNT: CPT

## 2023-03-19 PROCEDURE — 36415 COLL VENOUS BLD VENIPUNCTURE: CPT

## 2023-03-19 PROCEDURE — 72131 CT LUMBAR SPINE W/O DYE: CPT | Mod: ME

## 2023-03-19 PROCEDURE — 80053 COMPREHEN METABOLIC PANEL: CPT

## 2023-03-19 PROCEDURE — 99284 EMERGENCY DEPT VISIT MOD MDM: CPT | Mod: 25

## 2023-03-19 PROCEDURE — 71045 X-RAY EXAM CHEST 1 VIEW: CPT

## 2023-03-19 PROCEDURE — 93005 ELECTROCARDIOGRAM TRACING: CPT

## 2023-03-19 PROCEDURE — 84484 ASSAY OF TROPONIN QUANT: CPT

## 2023-03-19 PROCEDURE — 87637 SARSCOV2&INF A&B&RSV AMP PRB: CPT

## 2023-03-19 PROCEDURE — 70450 CT HEAD/BRAIN W/O DYE: CPT | Mod: MB

## 2023-03-19 PROCEDURE — 99238 HOSP IP/OBS DSCHRG MGMT 30/<: CPT

## 2023-03-19 RX ORDER — NITROFURANTOIN MACROCRYSTAL 50 MG
1 CAPSULE ORAL
Qty: 0 | Refills: 0 | DISCHARGE
Start: 2023-03-19 | End: 2023-03-25

## 2023-03-19 RX ORDER — ACETAMINOPHEN 500 MG
2 TABLET ORAL
Qty: 0 | Refills: 0 | DISCHARGE
Start: 2023-03-19

## 2023-03-19 RX ADMIN — Medication 81 MILLIGRAM(S): at 13:47

## 2023-03-19 RX ADMIN — PANTOPRAZOLE SODIUM 40 MILLIGRAM(S): 20 TABLET, DELAYED RELEASE ORAL at 06:16

## 2023-03-19 RX ADMIN — Medication 100 MILLIGRAM(S): at 06:15

## 2023-03-19 RX ADMIN — Medication 650 MILLIGRAM(S): at 13:48

## 2023-03-19 RX ADMIN — AMLODIPINE BESYLATE 5 MILLIGRAM(S): 2.5 TABLET ORAL at 06:16

## 2023-03-19 RX ADMIN — METHOCARBAMOL 500 MILLIGRAM(S): 500 TABLET, FILM COATED ORAL at 13:48

## 2023-03-19 NOTE — ED ADULT NURSE REASSESSMENT NOTE - NS ED NURSE REASSESS COMMENT FT1
Assumed care of pt @455. Pt. A&Ox3 resting in bed comfortably. Pt. respirations even and unlabored. Pt. abdomen soft, nondistended & nontender. Pt. skin warm dry intact appropriate for ethnicity. Pt. has not been able to ambulate due to B/L LE weakness x1day. At baseline pt. states she can ambulate with 2 canes at home. Pt. remains in CDU pending PT eval for possible rehab placement. Pt. has no complaints of pain or discomfort at this time. Denies nausea/vomiting/dizziness/numbness/tingling/headaches/abdominal pain/chest pain/pressure/palpitations/SOB. VSS - view flowsheet. Medications to be administered as ordered. Call bell within reach encouraged to call for assistance when needed. Side rails remain up for pt. safety. Will continue to monitor.
A&Ox4, VSS. OOB w/ 1 assist, BSC in use. No sings of acute distress noted. No c/o pain or discomfort. Pt is resting quietly in bed, chest rise and fall noted. Purewick in place. Plan of care maintained.
PT [place] completed.  x1 person assist. MSW pending
Assumed care of the patient @5030. Pt A&Ox3, VSS afebrile. Pt awaiting [facility] placement. Pt incontinent of urine, Priamafit in place draining clear yellow urine. Pt c/o cough and white sputum. Pt encouraged to cough and deep breathe 94%on room air. Patient in understanding of plan of care. Patient with no further questions for the RN. Resting in comfort. Call bell within reach and encouraged to use when assistance needed. Will continue to monitor.

## 2023-03-19 NOTE — CHART NOTE - NSCHARTNOTEFT_GEN_A_CORE
SW NOTE: Pt and son in Patrice Rivas met at bedside to discuss [facility] options, both pt and Patrice want [facility]. SW s/w Sheela in Admissions at [facility], can accept pt today. Pt and Patrice notified that facility can accept today. Treatment team notified. NEAF completed and uploaded into ACM. [facility] notified through ACM to set up transport ambulance for 4pm. Transport ltr provided and transport packet completed as well as provided to attending nurse for d/c f/u.

## 2023-03-19 NOTE — ED ADULT NURSE REASSESSMENT NOTE - NURSING MUSC STRENGTH
hand grasp, leg strength strong and equal bilaterally
hand grasp, leg strength strong and equal bilaterally
limitations in strength

## 2023-03-19 NOTE — ED CDU PROVIDER DISPOSITION NOTE - CLINICAL COURSE
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 Son-in-law reported that patient has had progressive trouble walking, ongoing for over 1 year. Had outpt CTs, gets epidural injections most recently a few weeks ago. She lives at home by herself and normally can ambulate using 2 canes. Labs and CT unremarkable, notes chronic degenerative changes in spine. Pain improved with tylenol, robaxin. Pt placed on obs for PT eval and SW. PT recommending [facility]. Found to have+UA, treated with macrobid, culture pending. SW following for dispo planning, pt accepted to [facility].

## 2023-03-19 NOTE — ED ADULT NURSE REASSESSMENT NOTE - NURSING MUSC ROM
full range of motion in all extremities
- - -

## 2023-03-19 NOTE — ED CDU PROVIDER DISPOSITION NOTE - NSFOLLOWUPINSTRUCTIONS_ED_ALL_ED_FT
- Return to the ED for any new or worsening symptoms.   - Continue your medications  - Please complete course of Macrobid antibiotic, take until 3/25    Urinary Tract Infection    A urinary tract infection (UTI) is an infection of any part of the urinary tract, which includes the kidneys, ureters, bladder, and urethra. Risk factors include ignoring your need to urinate, wiping back to front if female, being an uncircumcised male, and having diabetes or a weak immune system. Symptoms include frequent urination, pain or burning with urination, foul smelling urine, cloudy urine, pain in the lower abdomen, blood in the urine, and fever. If you were prescribed an antibiotic medicine, take it as told by your health care provider. Do not stop taking the antibiotic even if you start to feel better.    SEEK IMMEDIATE MEDICAL CARE IF YOU HAVE ANY OF THE FOLLOWING SYMPTOMS: severe back or abdominal pain, fever, inability to keep fluids or medicine down, dizziness/lightheadedness, or a change in mental status.

## 2023-03-19 NOTE — ED ADULT NURSE REASSESSMENT NOTE - GENERAL PATIENT STATE
comfortable appearance/cooperative
cooperative
comfortable appearance/cooperative/smiling/interactive

## 2023-03-19 NOTE — ED CDU PROVIDER DISPOSITION NOTE - ATTENDING CONTRIBUTION TO CARE
I, Juan Garner, have personally performed a face to face diagnostic evaluation on this patient. I have reviewed the PATRICIA note and agree with the history, exam and plan of care, except as noted.    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 CT lumbar with degeneration. CT head normal. patient seen by PT. NERI placed the patient at [facility].

## 2023-03-19 NOTE — ED CDU PROVIDER SUBSEQUENT DAY NOTE - HISTORY
no acute events overnight, pt with positive urinalysis, pt started on macrobid, pending [facility] placement

## 2023-03-19 NOTE — ED CDU PROVIDER DISPOSITION NOTE - PATIENT PORTAL LINK FT
You can access the FollowMyHealth Patient Portal offered by [facility] by registering at the following website: [email or web address]. By joining [facility]’s FollowMyHealth portal, you will also be able to view your health information using other applications (apps) compatible with our system.

## 2023-03-19 NOTE — ED CDU PROVIDER SUBSEQUENT DAY NOTE - ATTENDING APP SHARED VISIT CONTRIBUTION OF CARE
I, Juan Garner, have personally performed a face to face diagnostic evaluation on this patient. I have reviewed the PATRICIA note and agree with the history, exam and plan of care, except as noted.    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 CT lumbar with degeneration. CT head normal. patient seen by PT, pending [facility] placement.

## 2023-03-19 NOTE — ED ADULT NURSE REASSESSMENT NOTE - COMFORT CARE
meal provided/plan of care explained/po fluids offered/repositioned/side rails up/wait time explained/warm blanket provided
assisted to bedside commode/meal provided/plan of care explained/po fluids offered/repositioned
darkened lights/plan of care explained/po fluids offered/repositioned/side rails up

## 2023-03-20 LAB
CULTURE RESULTS: SIGNIFICANT CHANGE UP
SPECIMEN SOURCE: SIGNIFICANT CHANGE UP

## 2023-03-30 ENCOUNTER — OFFICE (OUTPATIENT)
Dept: URBAN - METROPOLITAN AREA CLINIC 115 | Facility: CLINIC | Age: 83
Setting detail: OPHTHALMOLOGY
End: 2023-03-30
Payer: MEDICARE

## 2023-03-30 DIAGNOSIS — H43.811: ICD-10-CM

## 2023-03-30 DIAGNOSIS — H35.3212: ICD-10-CM

## 2023-03-30 DIAGNOSIS — H35.031: ICD-10-CM

## 2023-03-30 DIAGNOSIS — H35.3113: ICD-10-CM

## 2023-03-30 PROCEDURE — 92134 CPTRZ OPH DX IMG PST SGM RTA: CPT | Performed by: SPECIALIST

## 2023-03-30 PROCEDURE — J3490 CIME: Performed by: SPECIALIST

## 2023-03-30 PROCEDURE — 67028 INJECTION EYE DRUG: CPT | Performed by: SPECIALIST

## 2023-03-30 PROCEDURE — 99213 OFFICE O/P EST LOW 20 MIN: CPT | Performed by: SPECIALIST

## 2023-03-30 ASSESSMENT — CONFRONTATIONAL VISUAL FIELD TEST (CVF)
OD_FINDINGS: FULL
OS_FINDINGS: FULL

## 2023-03-30 ASSESSMENT — LID EXAM ASSESSMENTS
OS_BLEPHARITIS: 3+
OD_BLEPHARITIS: 1+ 2+

## 2023-03-30 ASSESSMENT — TONOMETRY: OD_IOP_MMHG: 19

## 2023-04-04 ASSESSMENT — KERATOMETRY
METHOD_AUTO_MANUAL: AUTO
OS_K1POWER_DIOPTERS: UTP
OD_K1POWER_DIOPTERS: 45.25
OD_AXISANGLE_DEGREES: 014
OD_K2POWER_DIOPTERS: 47.00

## 2023-04-04 ASSESSMENT — AXIALLENGTH_DERIVED: OD_AL: 22.8926

## 2023-04-04 ASSESSMENT — REFRACTION_AUTOREFRACTION
OD_AXIS: 117
OS_SPHERE: UTP
OD_CYLINDER: -1.75
OD_SPHERE: +0.25

## 2023-04-04 ASSESSMENT — VISUAL ACUITY
OS_BCVA: 20/50-1
OD_BCVA: PROSTHESIS

## 2023-04-04 ASSESSMENT — SPHEQUIV_DERIVED: OD_SPHEQUIV: -0.625

## 2023-06-06 ENCOUNTER — APPOINTMENT (OUTPATIENT)
Dept: RHEUMATOLOGY | Facility: CLINIC | Age: 83
End: 2023-06-06

## 2023-11-28 NOTE — ED ADULT NURSE NOTE - CHPI ED NUR SYMPTOMS NEG
no chills/no decreased eating/drinking/no dizziness/no fever/no nausea/no pain/no tingling/no vomiting/no weakness
Detail Level: Simple
Price (Do Not Change): 0.00
Instructions: This plan will send the code FBSE to the PM system.  DO NOT or CHANGE the price.

## 2023-12-13 ENCOUNTER — OFFICE (OUTPATIENT)
Dept: URBAN - METROPOLITAN AREA CLINIC 115 | Facility: CLINIC | Age: 83
Setting detail: OPHTHALMOLOGY
End: 2023-12-13
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
## 2023-12-13 PROCEDURE — NO SHOW FE NO SHOW FEE: Performed by: OPHTHALMOLOGY
